# Patient Record
Sex: FEMALE | Race: WHITE | NOT HISPANIC OR LATINO | Employment: OTHER | URBAN - METROPOLITAN AREA
[De-identification: names, ages, dates, MRNs, and addresses within clinical notes are randomized per-mention and may not be internally consistent; named-entity substitution may affect disease eponyms.]

---

## 2019-03-21 ENCOUNTER — OFFICE VISIT (OUTPATIENT)
Dept: FAMILY MEDICINE | Facility: CLINIC | Age: 64
End: 2019-03-21
Payer: MEDICARE

## 2019-03-21 VITALS
HEIGHT: 65 IN | BODY MASS INDEX: 21.47 KG/M2 | HEART RATE: 71 BPM | TEMPERATURE: 98 F | DIASTOLIC BLOOD PRESSURE: 80 MMHG | WEIGHT: 128.88 LBS | SYSTOLIC BLOOD PRESSURE: 128 MMHG | OXYGEN SATURATION: 98 %

## 2019-03-21 DIAGNOSIS — E03.9 HYPOTHYROIDISM (ACQUIRED): ICD-10-CM

## 2019-03-21 DIAGNOSIS — L29.0 RECTAL ITCHING: ICD-10-CM

## 2019-03-21 DIAGNOSIS — F32.A DEPRESSION, UNSPECIFIED DEPRESSION TYPE: ICD-10-CM

## 2019-03-21 DIAGNOSIS — J01.90 ACUTE SINUSITIS, RECURRENCE NOT SPECIFIED, UNSPECIFIED LOCATION: Primary | ICD-10-CM

## 2019-03-21 DIAGNOSIS — E78.2 HYPERLIPIDEMIA, MIXED: ICD-10-CM

## 2019-03-21 DIAGNOSIS — R30.0 DYSURIA: ICD-10-CM

## 2019-03-21 DIAGNOSIS — R93.3 ABNORMAL COLONOSCOPY: ICD-10-CM

## 2019-03-21 DIAGNOSIS — E05.00 GRAVES' DISEASE: ICD-10-CM

## 2019-03-21 LAB
BILIRUB SERPL-MCNC: NORMAL MG/DL
BLOOD URINE, POC: NORMAL
COLOR, POC UA: NORMAL
GLUCOSE UR QL STRIP: NORMAL
KETONES UR QL STRIP: NORMAL
LEUKOCYTE ESTERASE URINE, POC: NORMAL
NITRITE, POC UA: NORMAL
PH, POC UA: 5
PROTEIN, POC: NORMAL
SPECIFIC GRAVITY, POC UA: 1.02
UROBILINOGEN, POC UA: NORMAL

## 2019-03-21 PROCEDURE — 99204 PR OFFICE/OUTPT VISIT, NEW, LEVL IV, 45-59 MIN: ICD-10-PCS | Mod: S$PBB,,, | Performed by: NURSE PRACTITIONER

## 2019-03-21 PROCEDURE — 99999 PR PBB SHADOW E&M-NEW PATIENT-LVL IV: ICD-10-PCS | Mod: PBBFAC,,, | Performed by: NURSE PRACTITIONER

## 2019-03-21 PROCEDURE — 99204 OFFICE O/P NEW MOD 45 MIN: CPT | Mod: PBBFAC,PN | Performed by: NURSE PRACTITIONER

## 2019-03-21 PROCEDURE — 81002 URINALYSIS NONAUTO W/O SCOPE: CPT | Mod: PBBFAC,PN,59 | Performed by: NURSE PRACTITIONER

## 2019-03-21 PROCEDURE — 99204 OFFICE O/P NEW MOD 45 MIN: CPT | Mod: S$PBB,,, | Performed by: NURSE PRACTITIONER

## 2019-03-21 PROCEDURE — 99999 PR PBB SHADOW E&M-NEW PATIENT-LVL IV: CPT | Mod: PBBFAC,,, | Performed by: NURSE PRACTITIONER

## 2019-03-21 RX ORDER — ESCITALOPRAM OXALATE 5 MG/5ML
SOLUTION ORAL
COMMUNITY
End: 2019-03-21 | Stop reason: CLARIF

## 2019-03-21 RX ORDER — DOXYCYCLINE 100 MG/1
100 CAPSULE ORAL 2 TIMES DAILY
Qty: 20 CAPSULE | Refills: 0 | Status: SHIPPED | OUTPATIENT
Start: 2019-03-21 | End: 2019-04-04

## 2019-03-21 RX ORDER — FLUCONAZOLE 150 MG/1
150 TABLET ORAL ONCE
Qty: 1 TABLET | Refills: 0 | Status: SHIPPED | OUTPATIENT
Start: 2019-03-21 | End: 2019-03-21

## 2019-03-21 RX ORDER — DEXTROAMPHETAMINE SACCHARATE, AMPHETAMINE ASPARTATE, DEXTROAMPHETAMINE SULFATE AND AMPHETAMINE SULFATE 1.25; 1.25; 1.25; 1.25 MG/1; MG/1; MG/1; MG/1
TABLET ORAL
COMMUNITY
End: 2019-12-23

## 2019-03-21 RX ORDER — CETIRIZINE HYDROCHLORIDE 10 MG/1
10 TABLET ORAL DAILY
COMMUNITY
End: 2021-09-21

## 2019-03-21 RX ORDER — LEVOTHYROXINE SODIUM 125 UG/1
CAPSULE ORAL
COMMUNITY
End: 2019-03-25 | Stop reason: SDUPTHER

## 2019-03-21 RX ORDER — ESCITALOPRAM OXALATE 10 MG/1
10 TABLET ORAL DAILY
Qty: 30 TABLET | Refills: 11 | Status: SHIPPED | OUTPATIENT
Start: 2019-03-21 | End: 2020-04-06 | Stop reason: SDUPTHER

## 2019-03-21 NOTE — PROGRESS NOTES
This dictation has been generated using Modal Fluency Dictation some phonetic errors may occur. Please contact author for clarification if needed.     Problem List Items Addressed This Visit     Graves' disease    Relevant Orders    Comprehensive metabolic panel    TSH    Hypothyroidism (acquired)    Relevant Orders    Comprehensive metabolic panel    TSH    Hyperlipidemia, mixed    Relevant Orders    Comprehensive metabolic panel    Lipid panel    Abnormal colonoscopy    Overview     Colon polyp at [facility] 2018 repeat 5 years.            Other Visit Diagnoses     Acute sinusitis, recurrence not specified, unspecified location    -  Primary    Relevant Orders    CBC auto differential    Dysuria        Relevant Orders    POCT urine dipstick without microscope    Urinalysis    Rectal itching        Relevant Orders    Stool Exam-Ova,Cysts,Parasites    Depression, unspecified depression type        Relevant Orders    Ambulatory referral to Psychiatry        Orders Placed This Encounter    CBC auto differential    Comprehensive metabolic panel    Lipid panel    Urinalysis    TSH    Stool Exam-Ova,Cysts,Parasites    Ambulatory referral to Psychiatry    POCT urine dipstick without microscope    doxycycline (MONODOX) 100 MG capsule    fluconazole (DIFLUCAN) 150 MG Tab    escitalopram oxalate (LEXAPRO) 10 MG tablet     Multiple complaints. Difficult historian needs to follow up to review labs and discuss further issues  Refer to psych for depression, focus, and stimulant use  Sinusitis doxy  POCT uti doxy  Rectal itching. ocp  Empiric therapy for yeast vaginitis due to abx    Follow-up in about 2 weeks (around 4/4/2019).    ________________________________________________________________  ________________________________________________________________      Chief Complaint   Patient presents with    Sinus Problem     sinus issues and possible uti     History of present illness  This 63 y.o. presents today for  complaint of multiple issues however booked apt for sinus issues and possible uti. Pt is new to the area. Was follwing with Dr. Lopez in [place], [place]. She has not taken her thyroid med or depression med for a month.   One month ago she had flu like symptoms with f/c/and b/a. Since then she has had lingering sinus symptoms and puff eyes. She notes chest congestion. Also notes fatigue.   Thinks she has a uti. Notes frequency and burning. Symptoms for about a week. No meds. No fever or chills.  Depression and cognition issues states she was taking 5 mg lexapro and 5 mg stimulant. Has always had focus issues.   Elevated lipids. Was able to tolerate current statin only. She had ms pains on   Answers for HPI/ROS submitted by the patient on 3/20/2019   activity change: Yes  unexpected weight change: No  neck pain: No  hearing loss: No  rhinorrhea: Yes  trouble swallowing: No  eye discharge: Yes  visual disturbance: No  chest tightness: No  wheezing: No  chest pain: No  palpitations: No  blood in stool: No  constipation: No  vomiting: No  diarrhea: No  polydipsia: No  polyuria: Yes  difficulty urinating: No  hematuria: No  menstrual problem: No  dysuria: Yes  joint swelling: Yes  arthralgias: No  headaches: Yes  weakness: Yes  confusion: Yes  dysphoric mood: Yes    Reviewed histories with pt. It was from the portal but not pulling into note. Nothing contributory on fly hx.     History reviewed. No pertinent past medical history.    History reviewed. No pertinent surgical history.    History reviewed. No pertinent family history.    Social History     Socioeconomic History    Marital status:      Spouse name: None    Number of children: None    Years of education: None    Highest education level: None   Social Needs    Financial resource strain: None    Food insecurity - worry: None    Food insecurity - inability: None    Transportation needs - medical: None    Transportation needs - non-medical: None    Occupational History    None   Tobacco Use    Smoking status: None   Substance and Sexual Activity    Alcohol use: None    Drug use: None    Sexual activity: None   Other Topics Concern    None   Social History Narrative    None       Current Outpatient Medications   Medication Sig Dispense Refill    cetirizine (ZYRTEC) 10 MG tablet Take 10 mg by mouth once daily.      dextroamphetamine-amphetamine (ADDERALL) 5 mg Tab       levothyroxine 125 mcg Cap       rosuvastatin calcium (CRESTOR ORAL)       doxycycline (MONODOX) 100 MG capsule Take 1 capsule (100 mg total) by mouth 2 (two) times daily. 20 capsule 0    escitalopram oxalate (LEXAPRO) 10 MG tablet Take 1 tablet (10 mg total) by mouth once daily. 30 tablet 11    fluconazole (DIFLUCAN) 150 MG Tab Take 1 tablet (150 mg total) by mouth once. for 1 dose 1 tablet 0     No current facility-administered medications for this visit.        Review of patient's allergies indicates:  No Known Allergies    Physical examination  Vitals Reviewed  Gen. Well-dressed well-nourished   Skin warm dry and intact.  No rashes noted.  HEENT.  TM intact bilateral with normal light reflex.  No mastoid tenderness during percussion.  Nares patent bilateral.  Pharynx is unremarkable.  No maxillary or frontal sinus tenderness when percussed. Eyes prominent.   Neck is supple without adenopathy  Chest.  Respirations are even unlabored.  Lungs are clear to auscultation.  Cardiac regular rate and rhythm.  No chest wall adenopathy noted.  Abdomen is soft and not distended.  Bowel sounds are present.  No tenderness during palpation of the abdomen.  No Hepatosplenomegaly noted.  No hernia noted.  No CVA tenderness to percussion.    Neuro. Awake alert oriented x4.  Normal judgment and cognition noted.  Extremities no clubbing cyanosis or edema noted.     Call or return to clinic prn if these symptoms worsen or fail to improve as anticipated.

## 2019-03-22 ENCOUNTER — LAB VISIT (OUTPATIENT)
Dept: LAB | Facility: HOSPITAL | Age: 64
End: 2019-03-22
Attending: NURSE PRACTITIONER
Payer: MEDICARE

## 2019-03-22 DIAGNOSIS — J01.90 ACUTE SINUSITIS, RECURRENCE NOT SPECIFIED, UNSPECIFIED LOCATION: ICD-10-CM

## 2019-03-22 DIAGNOSIS — E03.9 HYPOTHYROIDISM (ACQUIRED): ICD-10-CM

## 2019-03-22 DIAGNOSIS — E05.00 GRAVES' DISEASE: ICD-10-CM

## 2019-03-22 DIAGNOSIS — E78.2 HYPERLIPIDEMIA, MIXED: ICD-10-CM

## 2019-03-22 LAB
ALBUMIN SERPL BCP-MCNC: 4 G/DL
ALP SERPL-CCNC: 57 U/L
ALT SERPL W/O P-5'-P-CCNC: 21 U/L
ANION GAP SERPL CALC-SCNC: 11 MMOL/L
AST SERPL-CCNC: 29 U/L
BASOPHILS # BLD AUTO: 0.14 K/UL
BASOPHILS NFR BLD: 1.3 %
BILIRUB SERPL-MCNC: 0.5 MG/DL
BUN SERPL-MCNC: 14 MG/DL
CALCIUM SERPL-MCNC: 9.3 MG/DL
CHLORIDE SERPL-SCNC: 104 MMOL/L
CHOLEST SERPL-MCNC: 473 MG/DL
CHOLEST/HDLC SERPL: 5.4 {RATIO}
CO2 SERPL-SCNC: 25 MMOL/L
CREAT SERPL-MCNC: 1 MG/DL
DIFFERENTIAL METHOD: ABNORMAL
EOSINOPHIL # BLD AUTO: 0.4 K/UL
EOSINOPHIL NFR BLD: 3.3 %
ERYTHROCYTE [DISTWIDTH] IN BLOOD BY AUTOMATED COUNT: 14.9 %
EST. GFR  (AFRICAN AMERICAN): >60 ML/MIN/1.73 M^2
EST. GFR  (NON AFRICAN AMERICAN): >60 ML/MIN/1.73 M^2
GLUCOSE SERPL-MCNC: 81 MG/DL
HCT VFR BLD AUTO: 42.6 %
HDLC SERPL-MCNC: 88 MG/DL
HDLC SERPL: 18.6 %
HGB BLD-MCNC: 13.6 G/DL
IMM GRANULOCYTES # BLD AUTO: 0.04 K/UL
IMM GRANULOCYTES NFR BLD AUTO: 0.4 %
LDLC SERPL CALC-MCNC: 363 MG/DL
LYMPHOCYTES # BLD AUTO: 4.1 K/UL
LYMPHOCYTES NFR BLD: 39.3 %
MCH RBC QN AUTO: 29.8 PG
MCHC RBC AUTO-ENTMCNC: 31.9 G/DL
MCV RBC AUTO: 93 FL
MONOCYTES # BLD AUTO: 0.5 K/UL
MONOCYTES NFR BLD: 4.8 %
NEUTROPHILS # BLD AUTO: 5.3 K/UL
NEUTROPHILS NFR BLD: 50.9 %
NONHDLC SERPL-MCNC: 385 MG/DL
NRBC BLD-RTO: 0 /100 WBC
PLATELET # BLD AUTO: 346 K/UL
PMV BLD AUTO: 9.1 FL
POTASSIUM SERPL-SCNC: 4.1 MMOL/L
PROT SERPL-MCNC: 7.2 G/DL
RBC # BLD AUTO: 4.57 M/UL
SODIUM SERPL-SCNC: 140 MMOL/L
T4 FREE SERPL-MCNC: <0.4 NG/DL
TRIGL SERPL-MCNC: 110 MG/DL
TSH SERPL DL<=0.005 MIU/L-ACNC: 148.28 UIU/ML
WBC # BLD AUTO: 10.48 K/UL

## 2019-03-22 PROCEDURE — 80061 LIPID PANEL: CPT

## 2019-03-22 PROCEDURE — 84439 ASSAY OF FREE THYROXINE: CPT

## 2019-03-22 PROCEDURE — 80053 COMPREHEN METABOLIC PANEL: CPT

## 2019-03-22 PROCEDURE — 36415 COLL VENOUS BLD VENIPUNCTURE: CPT | Mod: PN

## 2019-03-22 PROCEDURE — 84443 ASSAY THYROID STIM HORMONE: CPT

## 2019-03-22 PROCEDURE — 85025 COMPLETE CBC W/AUTO DIFF WBC: CPT

## 2019-03-25 DIAGNOSIS — E03.9 HYPOTHYROIDISM (ACQUIRED): ICD-10-CM

## 2019-03-25 DIAGNOSIS — E05.00 GRAVES' DISEASE: Primary | ICD-10-CM

## 2019-03-25 RX ORDER — LEVOTHYROXINE SODIUM 125 UG/1
125 CAPSULE ORAL DAILY
Qty: 90 CAPSULE | Refills: 1 | Status: SHIPPED | OUTPATIENT
Start: 2019-03-25 | End: 2019-08-28 | Stop reason: SDUPTHER

## 2019-03-25 NOTE — TELEPHONE ENCOUNTER
Thyroid hormones are abnormal. She has missed more than a month of meds. She needs to repeat her tsh in 6 weeks.

## 2019-03-26 ENCOUNTER — LAB VISIT (OUTPATIENT)
Dept: LAB | Facility: HOSPITAL | Age: 64
End: 2019-03-26
Attending: NURSE PRACTITIONER
Payer: MEDICARE

## 2019-03-26 DIAGNOSIS — L29.0 RECTAL ITCHING: ICD-10-CM

## 2019-03-26 PROCEDURE — 87209 SMEAR COMPLEX STAIN: CPT

## 2019-03-27 ENCOUNTER — TELEPHONE (OUTPATIENT)
Dept: FAMILY MEDICINE | Facility: CLINIC | Age: 64
End: 2019-03-27

## 2019-03-27 NOTE — TELEPHONE ENCOUNTER
Spoke with pharmacist, Rx as sent over as caps instead of tabs and caps are not available in generic. Pharmacist corrected to generic tabs. Pt notified and expressed understanding

## 2019-03-27 NOTE — TELEPHONE ENCOUNTER
----- Message from Юлия Morris sent at 3/27/2019 11:49 AM CDT -----  Contact: self   Patient want to know if you can call a new rx in for levothyroxine 125 mcg Cap because of insurance purpose 30 day supply please send to [facility], any questions please call back at [phone] (home)     [facility] - [place] - [address]  [address]  [place] [address]  Phone: [phone] Fax: [phone]     Called and spoke to patient, patient was notified of results and verbalized understanding.      Pharmacy: [id number] [place] [id number] [address]

## 2019-03-28 LAB — O+P STL TRI STN: NORMAL

## 2019-04-04 ENCOUNTER — OFFICE VISIT (OUTPATIENT)
Dept: FAMILY MEDICINE | Facility: CLINIC | Age: 64
End: 2019-04-04
Payer: MEDICARE

## 2019-04-04 DIAGNOSIS — E03.9 HYPOTHYROIDISM (ACQUIRED): Primary | ICD-10-CM

## 2019-04-04 DIAGNOSIS — R39.9 UTI SYMPTOMS: ICD-10-CM

## 2019-04-04 DIAGNOSIS — E78.2 HYPERLIPIDEMIA, MIXED: ICD-10-CM

## 2019-04-04 DIAGNOSIS — E05.00 GRAVES' DISEASE: ICD-10-CM

## 2019-04-04 PROCEDURE — 99999 PR PBB SHADOW E&M-EST. PATIENT-LVL II: CPT | Mod: PBBFAC,,, | Performed by: NURSE PRACTITIONER

## 2019-04-04 PROCEDURE — 99212 OFFICE O/P EST SF 10 MIN: CPT | Mod: PBBFAC,PN | Performed by: NURSE PRACTITIONER

## 2019-04-04 PROCEDURE — 99214 OFFICE O/P EST MOD 30 MIN: CPT | Mod: S$PBB,,, | Performed by: NURSE PRACTITIONER

## 2019-04-04 PROCEDURE — 99214 PR OFFICE/OUTPT VISIT, EST, LEVL IV, 30-39 MIN: ICD-10-PCS | Mod: S$PBB,,, | Performed by: NURSE PRACTITIONER

## 2019-04-04 PROCEDURE — 99999 PR PBB SHADOW E&M-EST. PATIENT-LVL II: ICD-10-PCS | Mod: PBBFAC,,, | Performed by: NURSE PRACTITIONER

## 2019-04-04 RX ORDER — NITROFURANTOIN 25; 75 MG/1; MG/1
100 CAPSULE ORAL 2 TIMES DAILY
Qty: 14 CAPSULE | Refills: 0 | Status: SHIPPED | OUTPATIENT
Start: 2019-04-04 | End: 2019-09-12 | Stop reason: ALTCHOICE

## 2019-04-04 NOTE — PROGRESS NOTES
This dictation has been generated using Modal Fluency Dictation some phonetic errors may occur. Please contact author for clarification if needed.     Problem List Items Addressed This Visit     Graves' disease    Hypothyroidism (acquired) - Primary    Hyperlipidemia, mixed      Other Visit Diagnoses     UTI symptoms        Relevant Orders    Urinalysis    Urine culture        Orders Placed This Encounter    Urine culture    Urinalysis    nitrofurantoin, macrocrystal-monohydrate, (MACROBID) 100 MG capsule     Multiple complaints. Difficult historian needs to follow up to review labs and discuss further issues  Refer to psych for depression, focus, and stimulant use  Follow-up on Graves disease and hypothyroid.  She restarted her meds.  Recheck labs at the end of the month.  Discuss that thyroid is affecting other labs specifically cholesterol.  Patient notes some dysuria and wants repeat of urinalysis.  Completed doxycycline as directed with relapse of symptoms.    Follow up in about 3 months (around 7/4/2019).    ________________________________________________________________  ________________________________________________________________      Chief Complaint   Patient presents with    Follow-up     burning when urinating     History of present illness  This 63 y.o. presents today for complaint of follow-up on thyroid.  Patient has resume meds.  She will redo her thyroid levels at the end of the month.  We discussed that her cholesterol is elevated secondary to the absence of thyroid meds for more than a month.  I will have to follow up on that in a couple of months and may consider addition of statin at that time.  Patient notes relapse of possible UTI symptoms.  She has had burning with urination.  She took the doxycycline as directed for sinus issue and UTI symptoms.  She notes improvement while taking however relapse afterwards.  She denies any fever or chills.  LOV 3/21/19 multiple issues however booked  apt for sinus issues and possible uti. Pt is new to the area. Was follwing with Dr. Lopez in [place], [place]. She has not taken her thyroid med or depression med for a month.   One month ago she had flu like symptoms with f/c/and b/a. Since then she has had lingering sinus symptoms and puff eyes. She notes chest congestion. Also notes fatigue.   Thinks she has a uti. Notes frequency and burning. Symptoms for about a week. No meds. No fever or chills.  Depression and cognition issues states she was taking 5 mg lexapro and 5 mg stimulant. Has always had focus issues.   Elevated lipids. Was able to tolerate current statin only. She had ms pains on      activity change: Yes  unexpected weight change: Yes  neck pain: No  hearing loss: No  rhinorrhea: No  trouble swallowing: No  eye discharge: No  visual disturbance: No  chest tightness: No  wheezing: No  chest pain: No  palpitations: No  blood in stool: No  constipation: No  vomiting: No  diarrhea: No  polydipsia: No  polyuria: Yes  difficulty urinating: No  hematuria: No  menstrual problem: No  dysuria: Yes  joint swelling: Yes  arthralgias: No  weakness: Yes  confusion: Yes  dysphoric mood: Yes    Reviewed histories with pt. It was from the portal but not pulling into note. Nothing contributory on fly hx.     Past Medical History:   Diagnosis Date    Abnormal colonoscopy 3/21/2019    Colon polyp at  2018 repeat 5 years.     Graves' disease 3/21/2019    Hyperlipidemia, mixed 3/21/2019    Hypothyroidism (acquired) 3/21/2019    Lyme disease        No past surgical history on file.    No family history on file.    Social History     Socioeconomic History    Marital status:      Spouse name: Not on file    Number of children: Not on file    Years of education: Not on file    Highest education level: Not on file   Occupational History    Not on file   Social Needs    Financial resource strain: Not on file    Food insecurity:     Worry: Not on file      Inability: Not on file    Transportation needs:     Medical: Not on file     Non-medical: Not on file   Tobacco Use    Smoking status: Not on file   Substance and Sexual Activity    Alcohol use: Not on file    Drug use: Not on file    Sexual activity: Not on file   Lifestyle    Physical activity:     Days per week: Not on file     Minutes per session: Not on file    Stress: Not on file   Relationships    Social connections:     Talks on phone: Not on file     Gets together: Not on file     Attends Cheondoism service: Not on file     Active member of club or organization: Not on file     Attends meetings of clubs or organizations: Not on file     Relationship status: Not on file   Other Topics Concern    Not on file   Social History Narrative    Not on file       Current Outpatient Medications   Medication Sig Dispense Refill    cetirizine (ZYRTEC) 10 MG tablet Take 10 mg by mouth once daily.      dextroamphetamine-amphetamine (ADDERALL) 5 mg Tab       escitalopram oxalate (LEXAPRO) 10 MG tablet Take 1 tablet (10 mg total) by mouth once daily. 30 tablet 11    levothyroxine 125 mcg Cap Take 125 mcg by mouth once daily. 90 capsule 1    nitrofurantoin, macrocrystal-monohydrate, (MACROBID) 100 MG capsule Take 1 capsule (100 mg total) by mouth 2 (two) times daily. 14 capsule 0    rosuvastatin calcium (CRESTOR ORAL)        No current facility-administered medications for this visit.        Review of patient's allergies indicates:  No Known Allergies    Physical examination  Vitals Reviewed  Gen. Well-dressed well-nourished   Skin warm dry and intact.  No rashes noted.  HEENT.  TM intact bilateral with normal light reflex.  No mastoid tenderness during percussion.  Nares patent bilateral.  Pharynx is unremarkable.  No maxillary or frontal sinus tenderness when percussed. Eyes prominent.   Neck is supple without adenopathy  Chest.  Respirations are even unlabored.  Lungs are clear to auscultation.  Cardiac  regular rate and rhythm.  No chest wall adenopathy noted.  Abdomen is soft and not distended.  Bowel sounds are present.  No tenderness during palpation of the abdomen.  No Hepatosplenomegaly noted.  No hernia noted.  No CVA tenderness to percussion.    Neuro. Awake alert oriented x4.  Normal judgment and cognition noted.  Extremities no clubbing cyanosis or edema noted.     Call or return to clinic prn if these symptoms worsen or fail to improve as anticipated.

## 2019-04-25 ENCOUNTER — LAB VISIT (OUTPATIENT)
Dept: LAB | Facility: HOSPITAL | Age: 64
End: 2019-04-25
Payer: MEDICARE

## 2019-04-25 DIAGNOSIS — E05.00 GRAVES' DISEASE: ICD-10-CM

## 2019-04-25 LAB — TSH SERPL DL<=0.005 MIU/L-ACNC: 3.32 UIU/ML (ref 0.4–4)

## 2019-04-25 PROCEDURE — 36415 COLL VENOUS BLD VENIPUNCTURE: CPT | Mod: PN

## 2019-04-25 PROCEDURE — 84443 ASSAY THYROID STIM HORMONE: CPT

## 2019-08-28 DIAGNOSIS — E03.9 HYPOTHYROIDISM (ACQUIRED): Primary | ICD-10-CM

## 2019-08-28 DIAGNOSIS — E78.2 HYPERLIPIDEMIA, MIXED: ICD-10-CM

## 2019-08-28 NOTE — TELEPHONE ENCOUNTER
Please see pending refill request and advise.   Last ov 4/4/19  Last filled 3/25/19 #90 1 refill

## 2019-08-29 RX ORDER — LEVOTHYROXINE SODIUM 125 UG/1
125 CAPSULE ORAL DAILY
Qty: 90 CAPSULE | Refills: 1 | Status: SHIPPED | OUTPATIENT
Start: 2019-08-29 | End: 2020-03-27 | Stop reason: SDUPTHER

## 2019-08-29 NOTE — TELEPHONE ENCOUNTER
Pt needs follow up apt to check cholesterol. She is overdue for follow up since last month  Thyroid med refilled.   Also overdue for other health screenings. Make apt for annual exam.

## 2019-09-05 ENCOUNTER — TELEPHONE (OUTPATIENT)
Dept: FAMILY MEDICINE | Facility: CLINIC | Age: 64
End: 2019-09-05

## 2019-09-05 NOTE — TELEPHONE ENCOUNTER
Received fax from [facility] pharmacy that pt would like rx filled through [facility]. Changed pharmacy to reflect [facility].

## 2019-09-12 ENCOUNTER — TELEPHONE (OUTPATIENT)
Dept: FAMILY MEDICINE | Facility: CLINIC | Age: 64
End: 2019-09-12

## 2019-09-12 ENCOUNTER — OFFICE VISIT (OUTPATIENT)
Dept: FAMILY MEDICINE | Facility: CLINIC | Age: 64
End: 2019-09-12
Payer: MEDICARE

## 2019-09-12 VITALS
TEMPERATURE: 98 F | OXYGEN SATURATION: 97 % | HEIGHT: 65 IN | SYSTOLIC BLOOD PRESSURE: 126 MMHG | HEART RATE: 66 BPM | BODY MASS INDEX: 21.16 KG/M2 | WEIGHT: 127 LBS | DIASTOLIC BLOOD PRESSURE: 84 MMHG

## 2019-09-12 DIAGNOSIS — J30.9 ALLERGIC RHINITIS, UNSPECIFIED SEASONALITY, UNSPECIFIED TRIGGER: ICD-10-CM

## 2019-09-12 DIAGNOSIS — E03.9 HYPOTHYROIDISM, ACQUIRED: Primary | ICD-10-CM

## 2019-09-12 DIAGNOSIS — H81.10 BENIGN PAROXYSMAL POSITIONAL VERTIGO, UNSPECIFIED LATERALITY: ICD-10-CM

## 2019-09-12 DIAGNOSIS — G45.9 TIA (TRANSIENT ISCHEMIC ATTACK): ICD-10-CM

## 2019-09-12 DIAGNOSIS — Z12.39 SCREENING FOR BREAST CANCER: ICD-10-CM

## 2019-09-12 DIAGNOSIS — E78.01 FAMILIAL HYPERCHOLESTEROLEMIA: ICD-10-CM

## 2019-09-12 DIAGNOSIS — R42 DIZZINESS: ICD-10-CM

## 2019-09-12 DIAGNOSIS — R42 VERTIGO: ICD-10-CM

## 2019-09-12 DIAGNOSIS — R07.89 CHEST DISCOMFORT: ICD-10-CM

## 2019-09-12 DIAGNOSIS — Z11.59 ENCOUNTER FOR SCREENING FOR OTHER VIRAL DISEASES: ICD-10-CM

## 2019-09-12 PROBLEM — E78.2 HYPERLIPIDEMIA, MIXED: Status: RESOLVED | Noted: 2019-03-21 | Resolved: 2019-09-12

## 2019-09-12 PROCEDURE — 99203 OFFICE O/P NEW LOW 30 MIN: CPT | Mod: HCNC,S$GLB,, | Performed by: FAMILY MEDICINE

## 2019-09-12 PROCEDURE — 99203 PR OFFICE/OUTPT VISIT, NEW, LEVL III, 30-44 MIN: ICD-10-PCS | Mod: HCNC,S$GLB,, | Performed by: FAMILY MEDICINE

## 2019-09-12 PROCEDURE — 3008F PR BODY MASS INDEX (BMI) DOCUMENTED: ICD-10-PCS | Mod: HCNC,CPTII,S$GLB, | Performed by: FAMILY MEDICINE

## 2019-09-12 PROCEDURE — 93005 ELECTROCARDIOGRAM TRACING: CPT | Mod: HCNC,S$GLB,, | Performed by: FAMILY MEDICINE

## 2019-09-12 PROCEDURE — 99999 PR PBB SHADOW E&M-EST. PATIENT-LVL V: ICD-10-PCS | Mod: PBBFAC,HCNC,, | Performed by: FAMILY MEDICINE

## 2019-09-12 PROCEDURE — 99999 PR PBB SHADOW E&M-EST. PATIENT-LVL V: CPT | Mod: PBBFAC,HCNC,, | Performed by: FAMILY MEDICINE

## 2019-09-12 PROCEDURE — 93005 EKG 12-LEAD: ICD-10-PCS | Mod: HCNC,S$GLB,, | Performed by: FAMILY MEDICINE

## 2019-09-12 PROCEDURE — 93010 EKG 12-LEAD: ICD-10-PCS | Mod: HCNC,S$GLB,, | Performed by: INTERNAL MEDICINE

## 2019-09-12 PROCEDURE — 93010 ELECTROCARDIOGRAM REPORT: CPT | Mod: HCNC,S$GLB,, | Performed by: INTERNAL MEDICINE

## 2019-09-12 PROCEDURE — 3008F BODY MASS INDEX DOCD: CPT | Mod: HCNC,CPTII,S$GLB, | Performed by: FAMILY MEDICINE

## 2019-09-12 RX ORDER — HYDROXYZINE HYDROCHLORIDE 25 MG/1
25 TABLET, FILM COATED ORAL 3 TIMES DAILY PRN
Qty: 90 TABLET | Refills: 0 | Status: SHIPPED | OUTPATIENT
Start: 2019-09-12 | End: 2019-10-08 | Stop reason: SDUPTHER

## 2019-09-12 RX ORDER — ROSUVASTATIN CALCIUM 40 MG/1
40 TABLET, COATED ORAL NIGHTLY
Qty: 90 TABLET | Refills: 4 | Status: SHIPPED | OUTPATIENT
Start: 2019-09-12 | End: 2021-01-08 | Stop reason: SDUPTHER

## 2019-09-12 NOTE — TELEPHONE ENCOUNTER
Pt states it was discussed at her appt that she would be getting an abx and hydroxyzine. She would like the bx to [facility] and the hydroxyzine to [facility].

## 2019-09-12 NOTE — TELEPHONE ENCOUNTER
I never discussed antibiotics.  I will send refill of hydroxyzine to [facility]

## 2019-09-12 NOTE — TELEPHONE ENCOUNTER
----- Message from Marianne Prabhakar sent at 9/12/2019 12:49 PM CDT -----  Contact: self  Type: Needs Medical Advice    Who Called:  self  Symptoms (please be specific):    How long has patient had these symptoms:    Pharmacy name and phone #:    [id number] - [place] - [address]  [address]  [facility] [address]  Phone: [phone] Fax: [phone]  [facility] - [place], [place] - [address] [facility]  [address] [facility] [address]  Phone: [phone] Fax: [phone]  Best Call Back Number: [phone] (home)   Additional Information: Patient would like a prescription of Hydroxyzine sent to her mail order pharmacy [facility]. She would like the antibiotic sent to local pharmacy. Patient states the pharmacy has not received the request. Please call patient. Thanks!
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
## 2019-09-12 NOTE — TELEPHONE ENCOUNTER
Called patient in regards to scheduling her for her annual mammogram. No answer. Left voicemail to return phone call to clinic.

## 2019-09-16 ENCOUNTER — LAB VISIT (OUTPATIENT)
Dept: LAB | Facility: HOSPITAL | Age: 64
End: 2019-09-16
Attending: FAMILY MEDICINE
Payer: MEDICARE

## 2019-09-16 DIAGNOSIS — E78.2 HYPERLIPIDEMIA, MIXED: ICD-10-CM

## 2019-09-16 LAB
ALBUMIN SERPL BCP-MCNC: 4 G/DL (ref 3.5–5.2)
ALP SERPL-CCNC: 52 U/L (ref 55–135)
ALT SERPL W/O P-5'-P-CCNC: 12 U/L (ref 10–44)
ANION GAP SERPL CALC-SCNC: 10 MMOL/L (ref 8–16)
AST SERPL-CCNC: 13 U/L (ref 10–40)
BILIRUB SERPL-MCNC: 0.5 MG/DL (ref 0.1–1)
BUN SERPL-MCNC: 12 MG/DL (ref 8–23)
CALCIUM SERPL-MCNC: 9.6 MG/DL (ref 8.7–10.5)
CHLORIDE SERPL-SCNC: 106 MMOL/L (ref 95–110)
CO2 SERPL-SCNC: 25 MMOL/L (ref 23–29)
CREAT SERPL-MCNC: 0.8 MG/DL (ref 0.5–1.4)
EST. GFR  (AFRICAN AMERICAN): >60 ML/MIN/1.73 M^2
EST. GFR  (NON AFRICAN AMERICAN): >60 ML/MIN/1.73 M^2
GLUCOSE SERPL-MCNC: 90 MG/DL (ref 70–110)
POTASSIUM SERPL-SCNC: 4.2 MMOL/L (ref 3.5–5.1)
PROT SERPL-MCNC: 7.1 G/DL (ref 6–8.4)
SODIUM SERPL-SCNC: 141 MMOL/L (ref 136–145)

## 2019-09-16 PROCEDURE — 80053 COMPREHEN METABOLIC PANEL: CPT | Mod: HCNC

## 2019-09-16 PROCEDURE — 36415 COLL VENOUS BLD VENIPUNCTURE: CPT | Mod: HCNC,PN

## 2019-09-16 NOTE — TELEPHONE ENCOUNTER
Spoke w/ pt. Advised as recommended. Pt states that she has a painful abscessed tooth and that Dr Grant said he would start an antibiotic to take while she tries to get in with w/ dentist. She states she is waiting for the dentist office to call back to get her worked in next week. She is not sure of the name of the dentist office. Pt would like this sent to [facility].

## 2019-09-17 ENCOUNTER — HOSPITAL ENCOUNTER (OUTPATIENT)
Dept: RADIOLOGY | Facility: HOSPITAL | Age: 64
Discharge: HOME | End: 2019-09-17
Attending: FAMILY MEDICINE
Payer: MEDICARE

## 2019-09-17 DIAGNOSIS — R42 DIZZINESS: ICD-10-CM

## 2019-09-17 DIAGNOSIS — G45.9 TIA (TRANSIENT ISCHEMIC ATTACK): ICD-10-CM

## 2019-09-17 DIAGNOSIS — K04.7 DENTAL ABSCESS: Primary | ICD-10-CM

## 2019-09-17 PROCEDURE — 70470 CT HEAD/BRAIN W/O & W/DYE: CPT | Mod: 26,HCNC,, | Performed by: RADIOLOGY

## 2019-09-17 PROCEDURE — 70470 CT HEAD/BRAIN W/O & W/DYE: CPT | Mod: TC,HCNC,PO

## 2019-09-17 PROCEDURE — 25500020 PHARM REV CODE 255: Mod: HCNC,PO | Performed by: FAMILY MEDICINE

## 2019-09-17 PROCEDURE — 70470 CT HEAD WITH AND WITHOUT: ICD-10-PCS | Mod: 26,HCNC,, | Performed by: RADIOLOGY

## 2019-09-17 PROCEDURE — 93880 US CAROTID BILATERAL: ICD-10-PCS | Mod: 26,HCNC,, | Performed by: RADIOLOGY

## 2019-09-17 PROCEDURE — 93880 EXTRACRANIAL BILAT STUDY: CPT | Mod: TC,HCNC,PO

## 2019-09-17 PROCEDURE — 93880 EXTRACRANIAL BILAT STUDY: CPT | Mod: 26,HCNC,, | Performed by: RADIOLOGY

## 2019-09-17 RX ORDER — AMOXICILLIN AND CLAVULANATE POTASSIUM 500; 125 MG/1; MG/1
1 TABLET, FILM COATED ORAL 2 TIMES DAILY
Qty: 20 TABLET | Refills: 0 | Status: SHIPPED | OUTPATIENT
Start: 2019-09-17 | End: 2019-12-23

## 2019-09-17 RX ADMIN — IOHEXOL 75 ML: 350 INJECTION, SOLUTION INTRAVENOUS at 12:09

## 2019-09-17 NOTE — TELEPHONE ENCOUNTER
Spoke with pt, advised antibiotics and atarax were sent to the pharmacy as requested  Voiced understanding

## 2019-10-03 ENCOUNTER — LAB VISIT (OUTPATIENT)
Dept: LAB | Facility: HOSPITAL | Age: 64
End: 2019-10-03
Attending: INTERNAL MEDICINE
Payer: MEDICARE

## 2019-10-03 ENCOUNTER — OFFICE VISIT (OUTPATIENT)
Dept: CARDIOLOGY | Facility: CLINIC | Age: 64
End: 2019-10-03
Payer: MEDICARE

## 2019-10-03 VITALS
SYSTOLIC BLOOD PRESSURE: 120 MMHG | HEART RATE: 64 BPM | HEIGHT: 65 IN | WEIGHT: 126.31 LBS | DIASTOLIC BLOOD PRESSURE: 70 MMHG | BODY MASS INDEX: 21.04 KG/M2

## 2019-10-03 DIAGNOSIS — E78.01 FAMILIAL HYPERCHOLESTEROLEMIA: ICD-10-CM

## 2019-10-03 DIAGNOSIS — R07.89 ATYPICAL CHEST PAIN: ICD-10-CM

## 2019-10-03 DIAGNOSIS — E78.01 FAMILIAL HYPERCHOLESTEROLEMIA: Primary | ICD-10-CM

## 2019-10-03 PROCEDURE — 3008F BODY MASS INDEX DOCD: CPT | Mod: HCNC,CPTII,S$GLB, | Performed by: INTERNAL MEDICINE

## 2019-10-03 PROCEDURE — 83695 ASSAY OF LIPOPROTEIN(A): CPT | Mod: HCNC

## 2019-10-03 PROCEDURE — 99999 PR PBB SHADOW E&M-EST. PATIENT-LVL III: CPT | Mod: PBBFAC,HCNC,, | Performed by: INTERNAL MEDICINE

## 2019-10-03 PROCEDURE — 99204 PR OFFICE/OUTPT VISIT, NEW, LEVL IV, 45-59 MIN: ICD-10-PCS | Mod: HCNC,S$GLB,, | Performed by: INTERNAL MEDICINE

## 2019-10-03 PROCEDURE — 99204 OFFICE O/P NEW MOD 45 MIN: CPT | Mod: HCNC,S$GLB,, | Performed by: INTERNAL MEDICINE

## 2019-10-03 PROCEDURE — 36415 COLL VENOUS BLD VENIPUNCTURE: CPT | Mod: HCNC,PO

## 2019-10-03 PROCEDURE — 99999 PR PBB SHADOW E&M-EST. PATIENT-LVL III: ICD-10-PCS | Mod: PBBFAC,HCNC,, | Performed by: INTERNAL MEDICINE

## 2019-10-03 PROCEDURE — 3008F PR BODY MASS INDEX (BMI) DOCUMENTED: ICD-10-PCS | Mod: HCNC,CPTII,S$GLB, | Performed by: INTERNAL MEDICINE

## 2019-10-03 NOTE — PROGRESS NOTES
Subjective:    Patient ID:  Jackie Vanessa is a 64 y.o. female who presents for evaluation of Consult (Ref by Dr. Grant); Chest Pain; and Hyperlipidemia      Problem List Items Addressed This Visit        Cardiac/Vascular    Familial hypercholesterolemia - Primary      Other Visit Diagnoses     Atypical chest pain              HPI    Referred by Dr. Grant    The patient states that she has been having chest tightness for a few months kind of like a chest cold. Occasional unsteadiness as well. No relation to exertion.    Premature cardiac disease - Stroke and TIAs   Family history of familial hypercholesterolemia - not that she is aware of    Recent carotid doppler negative for significant stenosis    Personal history of heart attack or stroke - Had Stroke in the past and possible past TIAs  Family history of heart disease - Mom with high cholesterol    Past Medical History:   Diagnosis Date    Abnormal colonoscopy 3/21/2019    Colon polyp at  2018 repeat 5 years.     Graves' disease 3/21/2019    Hyperlipidemia, mixed 3/21/2019    Hypothyroidism (acquired) 3/21/2019    Lyme disease        History reviewed. No pertinent surgical history.    History reviewed. No pertinent family history.    Social History     Socioeconomic History    Marital status:      Spouse name: Not on file    Number of children: Not on file    Years of education: Not on file    Highest education level: Not on file   Occupational History    Not on file   Social Needs    Financial resource strain: Not on file    Food insecurity:     Worry: Not on file     Inability: Not on file    Transportation needs:     Medical: Not on file     Non-medical: Not on file   Tobacco Use    Smoking status: Current Every Day Smoker     Packs/day: 0.50     Years: 48.00     Pack years: 24.00     Types: Cigarettes    Smokeless tobacco: Never Used   Substance and Sexual Activity    Alcohol use: Not on file    Drug use: Not on file    Sexual  "activity: Not on file   Lifestyle    Physical activity:     Days per week: Not on file     Minutes per session: Not on file    Stress: Not on file   Relationships    Social connections:     Talks on phone: Not on file     Gets together: Not on file     Attends Episcopalian service: Not on file     Active member of club or organization: Not on file     Attends meetings of clubs or organizations: Not on file     Relationship status: Not on file   Other Topics Concern    Not on file   Social History Narrative    Not on file       Review of patient's allergies indicates:  No Known Allergies    Review of Systems   Constitution: Negative for decreased appetite, fever and malaise/fatigue.   Eyes: Negative for blurred vision.   Cardiovascular: Negative for chest pain, dyspnea on exertion, irregular heartbeat and leg swelling.   Respiratory: Negative for cough, hemoptysis, shortness of breath and wheezing.    Endocrine: Negative for cold intolerance and heat intolerance.   Hematologic/Lymphatic: Negative for bleeding problem.   Musculoskeletal: Negative for muscle weakness and myalgias.   Gastrointestinal: Negative for abdominal pain, constipation and diarrhea.   Genitourinary: Negative for bladder incontinence.   Neurological: Negative for dizziness and weakness.   Psychiatric/Behavioral: Negative for depression.        Objective:     Vitals:    10/03/19 1135   BP: 120/70   BP Location: Left arm   Patient Position: Sitting   BP Method: Medium (Manual)   Pulse: 64   Weight: 57.3 kg (126 lb 5.2 oz)   Height: 5' 5" (1.651 m)        Physical Exam   Constitutional: She is oriented to person, place, and time. She appears well-developed and well-nourished. No distress.   HENT:   Head: Normocephalic and atraumatic.   Neck: Neck supple. No JVD present.   Cardiovascular: Normal rate, regular rhythm and normal heart sounds. Exam reveals no gallop and no friction rub.   No murmur heard.  Pulmonary/Chest: Effort normal and breath " sounds normal. No respiratory distress. She has no wheezes. She has no rales.   Abdominal: Soft. Bowel sounds are normal. There is no tenderness. There is no rebound and no guarding.   Musculoskeletal: She exhibits no edema or tenderness.   Neurological: She is alert and oriented to person, place, and time.   Skin: Skin is warm and dry.   Psychiatric: Her behavior is normal.           Current Outpatient Medications on File Prior to Visit   Medication Sig    cetirizine (ZYRTEC) 10 MG tablet Take 10 mg by mouth once daily.    dextroamphetamine-amphetamine (ADDERALL) 5 mg Tab     escitalopram oxalate (LEXAPRO) 10 MG tablet Take 1 tablet (10 mg total) by mouth once daily.    hydrOXYzine HCl (ATARAX) 25 MG tablet Take 1 tablet (25 mg total) by mouth 3 (three) times daily as needed for Itching.    levothyroxine 125 mcg Cap Take 125 mcg by mouth once daily.    rosuvastatin (CRESTOR) 40 MG Tab Take 1 tablet (40 mg total) by mouth every evening.    amoxicillin-clavulanate 500-125mg (AUGMENTIN) 500-125 mg Tab Take 1 tablet (500 mg total) by mouth 2 (two) times daily. (Patient not taking: Reported on 10/3/2019)     No current facility-administered medications on file prior to visit.        Lipid Panel:   Lab Results   Component Value Date    CHOL 473 (H) 03/22/2019    HDL 88 (H) 03/22/2019    LDLCALC 363.0 (H) 03/22/2019    TRIG 110 03/22/2019    CHOLHDL 18.6 (L) 03/22/2019         The ASCVD Risk score ([place] NEO Jr., et al., 2013) failed to calculate for the following reasons:    The valid total cholesterol range is 130 to 320 mg/dL    All pertinent labs, imaging, and EKGs reviewed.    Assessment:       1. Familial hypercholesterolemia    2. Atypical chest pain         Plan:     Symptoms of atypical chest pain and dizziness  BP/Pulse good today  Patient meets criteria for familial hypercholesterolemia considering LDL is greater than 330 and has had prior stroke and likely TIAs    Echocardiogram  Nuclear stress test  Get  LPA level   Start Repatha 140 every 2 weeks.  Will send to [facility]    Recheck lipid panel and LPA level in 3 months   Continue crestor    Continue other cardiac medications  Mediterranean Diet/Cardiovascular Exercise Program    Patient queried and all questions were answered.    F/u in 3 months with lipid panel and LPA level prior. Will discuss exercise and smoking cessation at length at that time      Signed:    Ronal Mayo MD  10/3/2019 8:49 AM

## 2019-10-03 NOTE — LETTER
October 3, 2019      Drew Grant MD  [address]  [facility] [address]           [facility]  [address] [place] [address]  Phone: [phone]          Patient: Jackie Vanessa   MR Number: [id number]   YOB: 1955   Date of Visit: 10/3/2019       Dear Dr. Drew Grant:    Thank you for referring Jackie Vaenssa to me for evaluation. Attached you will find relevant portions of my assessment and plan of care.    If you have questions, please do not hesitate to call me. I look forward to following Jackie Vaenssa along with you.    Sincerely,    Ronal Mayo MD    Enclosure  CC:  No Recipients    If you would like to receive this communication electronically, please contact [email or web address] or [phone] to request more information on [employer] Link access.    For providers and/or their staff who would like to refer a patient to [facility], please contact us through our one-stop-shop provider referral line, [facility], at [phone].    If you feel you have received this communication in error or would no longer like to receive these types of communications, please e-mail [email or web address]

## 2019-10-04 ENCOUNTER — TELEPHONE (OUTPATIENT)
Dept: PHARMACY | Facility: CLINIC | Age: 64
End: 2019-10-04

## 2019-10-04 NOTE — TELEPHONE ENCOUNTER
Informed Patient  that [facility] received prescription for Repatha and benefits investigation is required.  [facility] will be back in touch once insurance determination is received.

## 2019-10-08 LAB — LPA SERPL-MCNC: 132 MG/DL (ref 0–30)

## 2019-10-09 RX ORDER — HYDROXYZINE HYDROCHLORIDE 25 MG/1
TABLET, FILM COATED ORAL
Qty: 90 TABLET | Refills: 0 | Status: SHIPPED | OUTPATIENT
Start: 2019-10-09 | End: 2020-09-14 | Stop reason: SDUPTHER

## 2019-10-10 ENCOUNTER — OFFICE VISIT (OUTPATIENT)
Dept: FAMILY MEDICINE | Facility: CLINIC | Age: 64
End: 2019-10-10
Payer: MEDICARE

## 2019-10-10 ENCOUNTER — OFFICE VISIT (OUTPATIENT)
Dept: OTOLARYNGOLOGY | Facility: CLINIC | Age: 64
End: 2019-10-10
Payer: MEDICARE

## 2019-10-10 ENCOUNTER — CLINICAL SUPPORT (OUTPATIENT)
Dept: AUDIOLOGY | Facility: CLINIC | Age: 64
End: 2019-10-10
Payer: MEDICARE

## 2019-10-10 VITALS — HEIGHT: 65 IN | WEIGHT: 124.75 LBS | BODY MASS INDEX: 20.79 KG/M2

## 2019-10-10 VITALS
WEIGHT: 125 LBS | DIASTOLIC BLOOD PRESSURE: 60 MMHG | TEMPERATURE: 99 F | HEIGHT: 65 IN | BODY MASS INDEX: 20.83 KG/M2 | HEART RATE: 69 BPM | OXYGEN SATURATION: 97 % | SYSTOLIC BLOOD PRESSURE: 130 MMHG

## 2019-10-10 DIAGNOSIS — E78.01 FAMILIAL HYPERCHOLESTEROLEMIA: ICD-10-CM

## 2019-10-10 DIAGNOSIS — Z23 IMMUNIZATION DUE: Primary | ICD-10-CM

## 2019-10-10 DIAGNOSIS — E05.00 GRAVES DISEASE: ICD-10-CM

## 2019-10-10 DIAGNOSIS — H91.93 HIGH FREQUENCY HEARING LOSS OF BOTH EARS: Primary | ICD-10-CM

## 2019-10-10 DIAGNOSIS — R42 DIZZINESS: ICD-10-CM

## 2019-10-10 DIAGNOSIS — R51.9 HEADACHE, CHRONIC DAILY: ICD-10-CM

## 2019-10-10 DIAGNOSIS — H93.19 TINNITUS, UNSPECIFIED LATERALITY: ICD-10-CM

## 2019-10-10 DIAGNOSIS — H90.3 BILATERAL HIGH FREQUENCY SENSORINEURAL HEARING LOSS: ICD-10-CM

## 2019-10-10 DIAGNOSIS — R42 DISEQUILIBRIUM: Primary | ICD-10-CM

## 2019-10-10 DIAGNOSIS — H93.13 TINNITUS, BILATERAL: ICD-10-CM

## 2019-10-10 PROCEDURE — 92567 TYMPANOMETRY: CPT | Mod: HCNC,S$GLB,, | Performed by: AUDIOLOGIST

## 2019-10-10 PROCEDURE — 3008F PR BODY MASS INDEX (BMI) DOCUMENTED: ICD-10-PCS | Mod: HCNC,CPTII,S$GLB, | Performed by: FAMILY MEDICINE

## 2019-10-10 PROCEDURE — 99999 PR PBB SHADOW E&M-EST. PATIENT-LVL III: ICD-10-PCS | Mod: PBBFAC,HCNC,, | Performed by: NURSE PRACTITIONER

## 2019-10-10 PROCEDURE — 90732 PPSV23 VACC 2 YRS+ SUBQ/IM: CPT | Mod: HCNC,S$GLB,, | Performed by: FAMILY MEDICINE

## 2019-10-10 PROCEDURE — 99999 PR PBB SHADOW E&M-EST. PATIENT-LVL I: ICD-10-PCS | Mod: PBBFAC,HCNC,,

## 2019-10-10 PROCEDURE — 99999 PR PBB SHADOW E&M-EST. PATIENT-LVL I: CPT | Mod: PBBFAC,HCNC,,

## 2019-10-10 PROCEDURE — 92557 COMPREHENSIVE HEARING TEST: CPT | Mod: HCNC,S$GLB,, | Performed by: AUDIOLOGIST

## 2019-10-10 PROCEDURE — 99204 OFFICE O/P NEW MOD 45 MIN: CPT | Mod: HCNC,S$GLB,, | Performed by: NURSE PRACTITIONER

## 2019-10-10 PROCEDURE — 92567 PR TYMPA2METRY: ICD-10-PCS | Mod: HCNC,S$GLB,, | Performed by: AUDIOLOGIST

## 2019-10-10 PROCEDURE — 92557 PR COMPREHENSIVE HEARING TEST: ICD-10-PCS | Mod: HCNC,S$GLB,, | Performed by: AUDIOLOGIST

## 2019-10-10 PROCEDURE — 99204 PR OFFICE/OUTPT VISIT, NEW, LEVL IV, 45-59 MIN: ICD-10-PCS | Mod: HCNC,S$GLB,, | Performed by: NURSE PRACTITIONER

## 2019-10-10 PROCEDURE — 99999 PR PBB SHADOW E&M-EST. PATIENT-LVL III: CPT | Mod: PBBFAC,HCNC,, | Performed by: NURSE PRACTITIONER

## 2019-10-10 PROCEDURE — 99499 UNLISTED E&M SERVICE: CPT | Mod: HCNC,S$GLB,, | Performed by: FAMILY MEDICINE

## 2019-10-10 PROCEDURE — G0009 PNEUMOCOCCAL POLYSACCHARIDE VACCINE 23-VALENT =>2YO SQ IM: ICD-10-PCS | Mod: HCNC,S$GLB,, | Performed by: FAMILY MEDICINE

## 2019-10-10 PROCEDURE — 99999 PR PBB SHADOW E&M-EST. PATIENT-LVL III: CPT | Mod: PBBFAC,HCNC,, | Performed by: FAMILY MEDICINE

## 2019-10-10 PROCEDURE — 99999 PR PBB SHADOW E&M-EST. PATIENT-LVL III: ICD-10-PCS | Mod: PBBFAC,HCNC,, | Performed by: FAMILY MEDICINE

## 2019-10-10 PROCEDURE — 3008F PR BODY MASS INDEX (BMI) DOCUMENTED: ICD-10-PCS | Mod: HCNC,CPTII,S$GLB, | Performed by: NURSE PRACTITIONER

## 2019-10-10 PROCEDURE — 99213 OFFICE O/P EST LOW 20 MIN: CPT | Mod: 25,HCNC,S$GLB, | Performed by: FAMILY MEDICINE

## 2019-10-10 PROCEDURE — 3008F BODY MASS INDEX DOCD: CPT | Mod: HCNC,CPTII,S$GLB, | Performed by: FAMILY MEDICINE

## 2019-10-10 PROCEDURE — 99499 RISK ADDL DX/OHS AUDIT: ICD-10-PCS | Mod: HCNC,S$GLB,, | Performed by: FAMILY MEDICINE

## 2019-10-10 PROCEDURE — 99213 PR OFFICE/OUTPT VISIT, EST, LEVL III, 20-29 MIN: ICD-10-PCS | Mod: 25,HCNC,S$GLB, | Performed by: FAMILY MEDICINE

## 2019-10-10 PROCEDURE — G0009 ADMIN PNEUMOCOCCAL VACCINE: HCPCS | Mod: HCNC,S$GLB,, | Performed by: FAMILY MEDICINE

## 2019-10-10 PROCEDURE — 3008F BODY MASS INDEX DOCD: CPT | Mod: HCNC,CPTII,S$GLB, | Performed by: NURSE PRACTITIONER

## 2019-10-10 PROCEDURE — 90732 PNEUMOCOCCAL POLYSACCHARIDE VACCINE 23-VALENT =>2YO SQ IM: ICD-10-PCS | Mod: HCNC,S$GLB,, | Performed by: FAMILY MEDICINE
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
## 2019-10-17 ENCOUNTER — CLINICAL SUPPORT (OUTPATIENT)
Dept: AUDIOLOGY | Facility: CLINIC | Age: 64
End: 2019-10-17
Payer: MEDICARE

## 2019-10-17 ENCOUNTER — PATIENT MESSAGE (OUTPATIENT)
Dept: OTOLARYNGOLOGY | Facility: CLINIC | Age: 64
End: 2019-10-17

## 2019-10-17 DIAGNOSIS — Z12.11 COLON CANCER SCREENING: ICD-10-CM

## 2019-10-17 DIAGNOSIS — R26.89 IMBALANCE: Primary | ICD-10-CM

## 2019-10-17 DIAGNOSIS — R42 DISEQUILIBRIUM: Primary | ICD-10-CM

## 2019-10-17 PROCEDURE — 92540 PR VESTIBULAR EVAL NYSTAG FOVL&PERPH STIM OSCIL TRACKING: ICD-10-PCS | Mod: HCNC,S$GLB,, | Performed by: AUDIOLOGIST

## 2019-10-17 PROCEDURE — 92537 PR CALORIC VSTBLR TEST W/REC BITHERMAL: ICD-10-PCS | Mod: HCNC,S$GLB,, | Performed by: AUDIOLOGIST

## 2019-10-17 PROCEDURE — 92537 CALORIC VSTBLR TEST W/REC: CPT | Mod: HCNC,S$GLB,, | Performed by: AUDIOLOGIST

## 2019-10-17 PROCEDURE — 92540 BASIC VESTIBULAR EVALUATION: CPT | Mod: HCNC,S$GLB,, | Performed by: AUDIOLOGIST
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
## 2019-10-17 NOTE — Clinical Note
Normal VNG. No evidence of caloric asymmetry or weakness.  Negative for BPPV bilaterally.  Recommend review of results with patient and referral to PT for VRT.

## 2019-10-21 ENCOUNTER — HOSPITAL ENCOUNTER (OUTPATIENT)
Dept: RADIOLOGY | Facility: HOSPITAL | Age: 64
Discharge: HOME | End: 2019-10-21
Attending: INTERNAL MEDICINE
Payer: MEDICARE

## 2019-10-21 ENCOUNTER — CLINICAL SUPPORT (OUTPATIENT)
Dept: CARDIOLOGY | Facility: CLINIC | Age: 64
End: 2019-10-21
Attending: INTERNAL MEDICINE
Payer: MEDICARE

## 2019-10-21 VITALS
SYSTOLIC BLOOD PRESSURE: 110 MMHG | HEART RATE: 75 BPM | WEIGHT: 125 LBS | DIASTOLIC BLOOD PRESSURE: 70 MMHG | BODY MASS INDEX: 20.83 KG/M2 | HEIGHT: 65 IN

## 2019-10-21 VITALS — WEIGHT: 125 LBS | HEIGHT: 65 IN | BODY MASS INDEX: 20.83 KG/M2

## 2019-10-21 DIAGNOSIS — R07.89 ATYPICAL CHEST PAIN: ICD-10-CM

## 2019-10-21 LAB
ASCENDING AORTA: 2.82 CM
AV INDEX (PROSTH): 1.03
AV MEAN GRADIENT: 4 MMHG
AV PEAK GRADIENT: 7 MMHG
AV VALVE AREA: 3.13 CM2
AV VELOCITY RATIO: 1.01
BSA FOR ECHO PROCEDURE: 1.61 M2
CV ECHO LV RWT: 0.45 CM
CV STRESS BASE HR: 62 BPM
DIASTOLIC BLOOD PRESSURE: 71 MMHG
DOP CALC AO PEAK VEL: 1.29 M/S
DOP CALC AO VTI: 28.51 CM
DOP CALC LVOT AREA: 3 CM2
DOP CALC LVOT DIAMETER: 1.97 CM
DOP CALC LVOT PEAK VEL: 1.3 M/S
DOP CALC LVOT STROKE VOLUME: 89.11 CM3
DOP CALCLVOT PEAK VEL VTI: 29.25 CM
E WAVE DECELERATION TIME: 271.49 MSEC
E/A RATIO: 0.89
E/E' RATIO: 5.37 M/S
ECHO LV POSTERIOR WALL: 0.8 CM (ref 0.6–1.1)
FRACTIONAL SHORTENING: 29 % (ref 28–44)
INTERVENTRICULAR SEPTUM: 0.79 CM (ref 0.6–1.1)
IVRT: 0.12 MSEC
LA MAJOR: 3.48 CM
LA MINOR: 3.58 CM
LA WIDTH: 3.01 CM
LEFT ATRIUM SIZE: 2.96 CM
LEFT ATRIUM VOLUME INDEX: 16.5 ML/M2
LEFT ATRIUM VOLUME: 26.73 CM3
LEFT INTERNAL DIMENSION IN SYSTOLE: 2.55 CM (ref 2.1–4)
LEFT VENTRICLE DIASTOLIC VOLUME INDEX: 33.45 ML/M2
LEFT VENTRICLE DIASTOLIC VOLUME: 54.18 ML
LEFT VENTRICLE MASS INDEX: 48 G/M2
LEFT VENTRICLE SYSTOLIC VOLUME INDEX: 14.5 ML/M2
LEFT VENTRICLE SYSTOLIC VOLUME: 23.5 ML
LEFT VENTRICULAR INTERNAL DIMENSION IN DIASTOLE: 3.59 CM (ref 3.5–6)
LEFT VENTRICULAR MASS: 77.75 G
LV LATERAL E/E' RATIO: 4.64 M/S
LV SEPTAL E/E' RATIO: 6.38 M/S
MV PEAK A VEL: 0.57 M/S
MV PEAK E VEL: 0.51 M/S
OHS CV CPX 1 MINUTE RECOVERY HEART RATE: 100 BPM
OHS CV CPX 85 PERCENT MAX PREDICTED HEART RATE MALE: 129
OHS CV CPX MAX PREDICTED HEART RATE: 151
OHS CV CPX PATIENT IS FEMALE: 1
OHS CV CPX PATIENT IS MALE: 0
OHS CV CPX PEAK DIASTOLIC BLOOD PRESSURE: 85 MMHG
OHS CV CPX PEAK HEAR RATE: 120 BPM
OHS CV CPX PEAK RATE PRESSURE PRODUCT: NORMAL
OHS CV CPX PEAK SYSTOLIC BLOOD PRESSURE: 162 MMHG
OHS CV CPX PERCENT MAX PREDICTED HEART RATE ACHIEVED: 79
OHS CV CPX RATE PRESSURE PRODUCT PRESENTING: 8680
PISA TR MAX VEL: 2.28 M/S
RA MAJOR: 3.38 CM
RA PRESSURE: 3 MMHG
RA WIDTH: 3.08 CM
RIGHT VENTRICULAR END-DIASTOLIC DIMENSION: 2.65 CM
SINUS: 2.53 CM
STJ: 2.29 CM
STRESS ECHO TARGET HR: 134.3 BPM
SYSTOLIC BLOOD PRESSURE: 140 MMHG
TDI LATERAL: 0.11 M/S
TDI SEPTAL: 0.08 M/S
TDI: 0.1 M/S
TR MAX PG: 21 MMHG
TRICUSPID ANNULAR PLANE SYSTOLIC EXCURSION: 2.02 CM
TV REST PULMONARY ARTERY PRESSURE: 24 MMHG

## 2019-10-21 PROCEDURE — 93306 ECHO (CUPID ONLY): ICD-10-PCS | Mod: HCNC,S$GLB,, | Performed by: INTERNAL MEDICINE

## 2019-10-21 PROCEDURE — 93015 STRESS TEST WITH MYOCARDIAL PERFUSION (CUPID ONLY): ICD-10-PCS | Mod: HCNC,,, | Performed by: INTERNAL MEDICINE

## 2019-10-21 PROCEDURE — 78452 STRESS TEST WITH MYOCARDIAL PERFUSION (CUPID ONLY): ICD-10-PCS | Mod: 26,HCNC,, | Performed by: INTERNAL MEDICINE

## 2019-10-21 PROCEDURE — 93306 TTE W/DOPPLER COMPLETE: CPT | Mod: HCNC,S$GLB,, | Performed by: INTERNAL MEDICINE

## 2019-10-21 PROCEDURE — 99999 PR PBB SHADOW E&M-EST. PATIENT-LVL I: ICD-10-PCS | Mod: PBBFAC,HCNC,,

## 2019-10-21 PROCEDURE — 78452 HT MUSCLE IMAGE SPECT MULT: CPT | Mod: 26,HCNC,, | Performed by: INTERNAL MEDICINE

## 2019-10-21 PROCEDURE — 93015 CV STRESS TEST SUPVJ I&R: CPT | Mod: HCNC,,, | Performed by: INTERNAL MEDICINE

## 2019-10-21 PROCEDURE — 99999 PR PBB SHADOW E&M-EST. PATIENT-LVL II: CPT | Mod: PBBFAC,HCNC,,

## 2019-10-21 PROCEDURE — 99999 PR PBB SHADOW E&M-EST. PATIENT-LVL II: ICD-10-PCS | Mod: PBBFAC,HCNC,,

## 2019-10-21 PROCEDURE — 99999 PR PBB SHADOW E&M-EST. PATIENT-LVL I: CPT | Mod: PBBFAC,HCNC,,

## 2019-10-23 NOTE — TELEPHONE ENCOUNTER
Documentation only:    Prior authorization for Repatha has been approved for 6 months.    Approval dates:  10/23/2019 through 04/23/2020    Patient co-pay: $8.50

## 2019-10-24 ENCOUNTER — CLINICAL SUPPORT (OUTPATIENT)
Dept: REHABILITATION | Facility: HOSPITAL | Age: 64
End: 2019-10-24
Payer: MEDICARE

## 2019-10-24 DIAGNOSIS — R26.81 GAIT INSTABILITY: Primary | ICD-10-CM

## 2019-10-24 DIAGNOSIS — M62.81 MUSCLE WEAKNESS: ICD-10-CM

## 2019-10-24 PROCEDURE — 97162 PT EVAL MOD COMPLEX 30 MIN: CPT | Mod: HCNC,PN | Performed by: PHYSICAL THERAPIST

## 2019-10-24 PROCEDURE — 97110 THERAPEUTIC EXERCISES: CPT | Mod: HCNC,PN | Performed by: PHYSICAL THERAPIST

## 2019-10-25 NOTE — PLAN OF CARE
[facility]  Physical Therapy Initial Evaluation    Name: Jackie Vanessa  Clinic Number: [id number]    Therapy Diagnosis:   Encounter Diagnoses   Name Primary?    Gait instability Yes    Muscle weakness      Physician: Marley Johnson NP    Physician Orders: PT Eval and Treat No inner ear problems. Balance Retraining therapy  Medical Diagnosis from Referral: Imbalance  Evaluation Date: 10/24/2019  Authorization Period Expiration: 10-16-20  Plan of Care Expiration: 12-19-19  Visit # / Visits authorized: 1/ 1  MD Follow up appointment: none scheduled    Time In: 4:02  Time Out: 4:55  Total Billable Time: 53 minutes    Precautions: Standard migraines, Graves disease    Subjective   Date of onset: November 2018  History of current condition - Asiya reports: she had moved and gotten flu and has Graves disease and thyroid was off so adjusted and feeling less tired, but still with dizziness.  In 2012 her head hit brick wall and had whiplash injury and had R UE radicular symptoms and had PT for about a year Pt states she was evaluated by cardiologist recently and was cleared 2 days ago and wants to start a walking program.  Pt states she feels dizzy when she walks and heat makes it worse.     Medical History:   Past Medical History:   Diagnosis Date    Abnormal colonoscopy 3/21/2019    Colon polyp at  2018 repeat 5 years.     Graves' disease 3/21/2019    Hyperlipidemia, mixed 3/21/2019    Hypothyroidism (acquired) 3/21/2019    Lyme disease        Surgical History:   Jackie Vanessa  has no past surgical history on file.    Medications:   Jackie has a current medication list which includes the following prescription(s): amoxicillin-clavulanate 500-125mg, cetirizine, dextroamphetamine-amphetamine, escitalopram oxalate, evolocumab, hydroxyzine hcl, levothyroxine, and rosuvastatin.    Allergies:   Review of patient's allergies indicates:  No Known Allergies     Imaging, CT scan films: There  is no acute abnormality.  There is no hemorrhage, mass/mass effect, acute edema or ischemia.  There is no pathologic enhancement.  There is mild nonspecific white matter change.  It should be noted that MRI is more sensitive in the detection of subtle or acute nonhemorrhagic ischemic disease.    Prior Therapy: 2012 for neck injury  Social History: her cousin is staying with her 2 story house and bedrooms upstairs, no trouble with steps   Occupation: on disability due to Graves disease, intolerance to heat and no stamina  2007  Prior Level of Function: walked 3-4 times a week 1 mile   Current Level of Function: unable to walk     Pain: not applicable     Pts goals: not walk like a drunk    Objective     Postural examination in standing:  - forward head  - forward shoulders    Postural examination in sitting:   - forward head  - forward shoulders    Functional assessment: no deficits in areas noted below  - walking/gait:  - sit to stand:   - sit to supine:        - supine to sit:   - supine to prone:      Standing eyes closed able to remain balanced without assistance, slight sway, c/o feeling like on a boat  Able to march in place with eyes closed and min movement noted   Gaze stabilization appears WNL  Romano Balance score 52/56 which is low risk for fall      Flexibility testing:  - hamstrings:     90/90 test R 20 L 20           - gastrocnemius:   DF ankle R 5 degrees L 5 degrees      Muscle Strength  MMT R L   Hip flexion 4+/5 4+/5   Hip abduction 4/5 4/5   Hip extension 3+/5 3+/5   Glut max 3-/5 3-/5        Knee extension 5/5 5/5   Knee flexion 5/5 5/5   Ankle dorsiflexion 5/5 5/5   Ankle plantar flexion 5/5 5/5   Ankle inversion 5/5 5/5   Ankle eversion 5/5 5/5     Endurance is / fair    Sensation: Intact      CMS Impairment/Limitation/Restriction for FOTO vertigo Survey    Therapist reviewed FOTO scores for Jackie VALERY Vanessa on 10/24/2019.   FOTO documents entered into [employer] - see Media section.    Limitation  Score: 26%       TREATMENT   Treatment Time In: 4:40  Treatment Time Out: 4:55  Total Treatment time separate from Evaluation: 15 minutes    Asiya received therapeutic exercises to develop strength, endurance and balance for 15 minutes including:  Leg lift prone alternate x 10  Hip ext prone with bent knee x 10  Single leg balance x 30 sec    Home Exercises and Patient Education Provided    Education provided:   - HEP progression of balance work to improve kinesthetic sense and proprioception required to improve balance    Written Home Exercises Provided: yes.  Exercises were reviewed and Asiya was able to demonstrate them prior to the end of the session.  Asiya demonstrated good  understanding of the education provided.     See EMR under Patient Instructions for exercises provided 10/24/2019.    Assessment   Jackie is a 64 y.o. female referred to outpatient Physical Therapy with a medical diagnosis of imbalance. Pt presents with muscle weakness and balance issues    Pt prognosis is Good.   Pt will benefit from skilled outpatient Physical Therapy to address the deficits stated above and in the chart below, provide pt/family education, and to maximize pt's level of independence.     Plan of care discussed with patient: Yes  Pt's spiritual, cultural and educational needs considered and patient is agreeable to the plan of care and goals as stated below:     Anticipated Barriers for therapy: Graves disease    Medical Necessity is demonstrated by the following  History  Co-morbidities and personal factors that may impact the plan of care Co-morbidities:   Graves disease    Personal Factors:   no deficits     moderate   Examination  Body Structures and Functions, activity limitations and participation restrictions that may impact the plan of care Body Regions:   lower extremities  trunk    Body Systems:    balance  gait    Participation Restrictions:   ADL    Activity limitations:   Learning and applying knowledge  no  deficits    General Tasks and Commands  no deficits    Communication  no deficits    Mobility  walking    Self care  no deficits    Domestic Life  shopping  cooking  doing house work (cleaning house, washing dishes, laundry)  assisting others    Interactions/Relationships  no deficits    Life Areas  no deficits    Community and Social Life  no deficits         moderate   Clinical Presentation evolving clinical presentation with changing clinical characteristics moderate   Decision Making/ Complexity Score: moderate     GOALS:   Short Term Goals:  4 weeks  Increase strength 1/2 muscle grade  Be able to perform HEP with minimal cueing required  Improve Romano Balance score  to 53    Long Term Goals: 8 weeks  Improve muscle strength 1 muscle grade  Restore ability to ambulate with normal gait pattern at all times  Walking for ADL and exercise will be restored without instability  Restore ability to stand for ADL without instability  Restore ability to perform ADL's and household activities independently and without instability  Improve Romano Balance score  to 54    Plan   Plan of care Certification: 10/24/2019 to 12-19-19.    Outpatient Physical Therapy 2 times weekly for 8 weeks to include the following interventions: Therapeutic exercises, manual therapy techniques neuromuscular re-education, therapeutic activities, modalities such as moist heat, ice, ultrasound and electrical stimulation, dry needling, and temporary orthotics will be considered and utilized as needed.      Jackie Nieves, PT        I certify the need for these services furnished under this plan of treatment and while under my care.    ____________________________________ Physician/Referring Practitioner                                  Date of Signature

## 2019-10-28 ENCOUNTER — CLINICAL SUPPORT (OUTPATIENT)
Dept: REHABILITATION | Facility: HOSPITAL | Age: 64
End: 2019-10-28
Payer: MEDICARE

## 2019-10-28 DIAGNOSIS — R26.81 GAIT INSTABILITY: Primary | ICD-10-CM

## 2019-10-28 DIAGNOSIS — M62.81 MUSCLE WEAKNESS: ICD-10-CM

## 2019-10-28 PROCEDURE — 97110 THERAPEUTIC EXERCISES: CPT | Mod: HCNC,PN

## 2019-10-28 PROCEDURE — 97112 NEUROMUSCULAR REEDUCATION: CPT | Mod: HCNC,PN

## 2019-10-31 ENCOUNTER — CLINICAL SUPPORT (OUTPATIENT)
Dept: REHABILITATION | Facility: HOSPITAL | Age: 64
End: 2019-10-31
Payer: MEDICARE

## 2019-10-31 DIAGNOSIS — R26.81 GAIT INSTABILITY: Primary | ICD-10-CM

## 2019-10-31 DIAGNOSIS — M62.81 MUSCLE WEAKNESS: ICD-10-CM

## 2019-10-31 PROCEDURE — 97112 NEUROMUSCULAR REEDUCATION: CPT | Mod: HCNC,PN

## 2019-10-31 PROCEDURE — 97110 THERAPEUTIC EXERCISES: CPT | Mod: HCNC,PN

## 2019-10-31 NOTE — PROGRESS NOTES
"  Physical Therapy Daily Treatment Note     Name: Jackie Vanessa  Clinic Number: [id number]    Therapy Diagnosis:   Encounter Diagnoses   Name Primary?    Gait instability Yes    Muscle weakness      Physician: Marley Johnson NP    Visit Date: 10/31/2019    Physician Orders: PT Eval and Treat No inner ear problems. Balance Retraining therapy  Medical Diagnosis from Referral: Imbalance  Evaluation Date: 10/24/2019  Authorization Period Expiration: 12-31-19  Plan of Care Expiration: 12-19-19  Visit # / Visits authorized: 2 / 20 (+1 from prior authorization)  MD Follow up appointment: none scheduled     Time In: 2:05  Time Out: 3:00  Total Billable Time: 55 minutes     Precautions: Standard migraines, Graves disease    Subjective     Pt reports: she tolerated treatment well overall last time but had increased stiffness in the neck and shoulders and a little soreness in the legs and hips.   She was compliant with home exercise program.  Response to previous treatment: muscle soreness, stiffness in the neck  Functional change: none noted    Pain: 0/10  Location: bilateral LEs      Objective     Asiya received therapeutic exercises to develop strength, endurance, flexibility, posture and core stabilization for 38 minutes including:  Supine march with core tight x 10 each  Supine hip abd RTB with eccentric control into adduction 2 x 10  Bridge with RTB for hip abd reese 2 x 10   SLR x 10 each   Sidelying Clams x 10  Leg lift prone alternate x 10  Hip ext prone with bent knee x 10      Asiya participated in neuromuscular re-education activities to improve: Balance, Coordination, Proprioception and Posture for 17 minutes. The following activities were included:  Standing march on airex foam x 20 each, alternating, without UE assist  Single leg balance x 30 sec - occasional UE assist to prevent LOB, SBA  Modified tandem stand 2 x 30" each, second round slightly closer together  (NP) Tandem balance x 20" each, more " difficult with L LE posterior, CGA  Balance with narrow ZULMA and eyes closed x 30, CGA        Home Exercises Provided and Patient Education Provided     Education provided:   - education on performing exercises with range of the muscles working to prevent compensation with other muscle groups  - rationale of proprioception and balance training for decreased shaking and jerking    Written Home Exercises Provided: yes. Pt also given red band for use with exercises at home  Exercises were reviewed and Asiya was able to demonstrate them prior to the end of the session.  Asiya demonstrated good  understanding of the education provided.     See EMR under Patient Instructions for exercises provided 10-24-19, 10-28-19, 10-31-19.    Assessment     Asiya presents with improved tolerance to treatment this date and is able to complete additional exercises. Improved stability with balance activities, decreased LOB with SLS and modified tandem balance. Did not perform sharp rhomberg balance this date due to the greater onset of shaking and dizziness at previous session with that exercise. Greater awareness of core stabilization with exercises as well.  Asiya is progressing well towards her goals.   Pt prognosis is Good.     Pt will continue to benefit from skilled outpatient physical therapy to address the deficits listed in the problem list box on initial evaluation, provide pt/family education and to maximize pt's level of independence in the home and community environment.     Pt's spiritual, cultural and educational needs considered and pt agreeable to plan of care and goals.     Anticipated Barriers for therapy: Graves disease     Goals:   Short Term Goals:  4 weeks  Increase strength 1/2 muscle grade  Be able to perform HEP with minimal cueing required  Improve Romano Balance score  to 53     Long Term Goals: 8 weeks  Improve muscle strength 1 muscle grade  Restore ability to ambulate with normal gait pattern at all  times  Walking for ADL and exercise will be restored without instability  Restore ability to stand for ADL without instability  Restore ability to perform ADL's and household activities independently and without instability  Improve Romano Balance score  to 54    Plan     Continue to progress B hip strength as well as proprioception and balance with standing static and dynamic activities. Progress modified tandem stance to true sharp rhomberg as able over the next few sessions.    Paola Toscano, PTA

## 2019-11-05 NOTE — TELEPHONE ENCOUNTER
Initial Repatha 140mg/ml [employer] PENs consult completed on 19 at 1:15pm (pt requested callback in 10 minutes when called at 1pm), as scheduled over the phone. Repatha 140mg/ml Sureclick PENs will be shipped on 19 to arrive at patient's home on 19 via [employer]. $ 8.50 copay ([employer]). Patient will start Repatha 140mg/ml Sureclick PENs on  19. Address confirmed. Confirmed 2 patient identifiers - name and . Therapy Appropriate.  --Injection experience: none. She did review the instructional PDF (she learns better like that) and she feels very comfortable with proceeding. She is still nervous about a self injection but has her cousin, Juanita, who has experience staying with her right now to assist as well.    Counseled patient on administration directions:  - Inject 140mg (1 pen) into the skin every 14 days.   - Take out of the refrigerator 30-60 minutes prior to injection.  - Wash hands before and after injection.  - Monthly RX will come with gauze, bandaids, and alcohol swabs.  - Patient may inject in either the tops of the thighs, abdomen- but at least 2 inches away from her belly button, or the outer part of her upper arm.  Patient was instructed to rotate injections sites.  - Patient is to wipe down the injection site with the alcohol pad, wait to dry.  Gently squeeze the area of the cleaned skin and hold it firmly.   Place the pen flat against the raised area of skin that is being squeezed, then push down on the button and release,  in 10-15 seconds you will hear a click and the window will go from from clear to yellow, indicating injection is complete.  - Patient should rotate injection sites.   - Patient will use sharps container; once full, per [place] law, she/ he may lock the sharps container and place in her trash. She/ he can then contact the Pharmacy and we will replace the sharps at no additional charge.    Patient was counseled on possible side effects:  - Injection site reaction: redness,  "soreness, itching, bruising, which should resolve within 3-5 days.  - flu-like symptoms    DDIs: Medication list reviewed with patient - no DDIs. Patient advised to call [facility] prior to starting any new medications - OTC, Rx or herbal.  - Patient to continue Crestor 40mg until further notice by provider; perfectly safe to take WITH Repatha.    Storage: advised to keep refrigerated for stability until expiration on the box, but room temperature is ok if used within 30 days. Pt advised to keep in the fridge in an area that will not freeze or get too warm.    Diet, smoking cessation and exercise recommendations offered, but patient declines. Patient says she is as "natural" as they come. She eats well and stays active, her cholesterol his hereditary. High LDL (363mg/dL) discussed and she understands this will help reduce her LDL and future cardiac risks greatly so she is committed to starting treatment.    Patient did not have any further questions. She was advised to keep a calendar to stay compliant. Consultation included: indication; goals of treatment; administration; storage and handling; side effects; how to handle side effects; the importance of compliance; how to handle missed doses; the importance of laboratory monitoring; the importance of keeping all follow up appointments.  Patient understands to report any medication changes to [facility] and provider. All questions answered and addressed to patients satisfaction. I will f/u with her in 1 week from start, [facility] to contact patient in 3 weeks for refills.     "

## 2019-11-07 ENCOUNTER — TELEPHONE (OUTPATIENT)
Dept: REHABILITATION | Facility: HOSPITAL | Age: 64
End: 2019-11-07

## 2019-11-11 ENCOUNTER — CLINICAL SUPPORT (OUTPATIENT)
Dept: REHABILITATION | Facility: HOSPITAL | Age: 64
End: 2019-11-11
Payer: MEDICARE

## 2019-11-11 DIAGNOSIS — M62.81 MUSCLE WEAKNESS: ICD-10-CM

## 2019-11-11 DIAGNOSIS — R26.81 GAIT INSTABILITY: Primary | ICD-10-CM

## 2019-11-11 PROCEDURE — 97110 THERAPEUTIC EXERCISES: CPT | Mod: HCNC,PN | Performed by: PHYSICAL THERAPIST

## 2019-11-11 NOTE — PROGRESS NOTES
Physical Therapy Daily Treatment Note     Name: Jackie Vanessa  Clinic Number: [id number]    Therapy Diagnosis:   Encounter Diagnoses   Name Primary?    Gait instability Yes    Muscle weakness      Physician: Marley Johnson NP    Visit Date: 11/11/2019    Physician Orders: PT Eval and Treat No inner ear problems. Balance Retraining therapy  Medical Diagnosis from Referral: Imbalance  Evaluation Date: 10/24/2019  Authorization Period Expiration: 12-31-19  Plan of Care Expiration: 12-19-19  Visit # / Visits authorized: 4 / 20   MD Follow up appointment: none scheduled     Time In: 3:02  Time Out: 4:02  Total Billable Time: 55 minutes     Precautions: Standard migraines, Graves disease    Subjective     Pt reports: she tripped when walking on [address] and landed on knees and hands and was very sore Pt states her shoulders were getting sore from ex and she got her cousin to help her with ex and no c/o soreness now.    She was compliant with home exercise program.  Response to previous treatment: muscle soreness, stiffness in the neck  Functional change: feeling more steady with balance    Pain: 0/10  Location: bilateral LEs      Objective     Asiya received therapeutic exercises to develop strength, endurance, flexibility, posture and core stabilization for 30 minutes including:  Supine march with core tight x 20 each  Supine hip abd RTB with eccentric control into adduction 2 x 10  Bridge with RTB for hip abd reese 2 x 10  SLR x 10 each  Sidelying Clams x 15   Hip abd sidelying x 10  Leg lift prone alternate x 10  Hip ext prone with bent knee x 10  Pt states she lost first day exercises and just recently found them, fatigue noted at end of 10 reps      Asiya participated in neuromuscular re-education activities to improve: Balance, Coordination, Proprioception and Posture for 25 minutes. The following activities were included:    Single leg balance x 30 sec B - occasional UE assist to prevent LOB, SBA  Tandem  balance x 30 sec each, CGA at times, worked on placing foot I in front of other without holding on to rail  Balance with narrow ZULMA and eyes closed x 30 sec, SBA 3 episodes of jerky movements, but recovered I    Foam walk x 2 minute  without UE assist   Standing on foam x 30 sec x 1   Standing on foam with feet together x 30 sec x 1   Standing on foam with feet together with eyes closed x 30 sec    Wobble board x 10 x 1 min balance x 2 planes      Home Exercises Provided and Patient Education Provided     Education provided:   - HEP   - how improved strength can improve balance    Written Home Exercises Provided: yes.   Exercises were reviewed and Asiya was able to demonstrate them prior to the end of the session.  Asiya demonstrated good  understanding of the education provided.     See EMR under Patient Instructions for exercises provided 10-24-19, 10-28-19, 10-31-19, 11-11-19.    Assessment   Pt reports improved balance.  Pt with improved balance as compared to last visit and able to further progress balance work in standing along with further progression with strengthening, pt with fatigue at end    Asiya is progressing well towards her goals.   Pt prognosis is Good.     Pt will continue to benefit from skilled outpatient physical therapy to address the deficits listed in the problem list box on initial evaluation, provide pt/family education and to maximize pt's level of independence in the home and community environment.     Pt's spiritual, cultural and educational needs considered and pt agreeable to plan of care and goals.     Anticipated Barriers for therapy: Graves disease     Goals:   Short Term Goals:  4 weeks  Increase strength 1/2 muscle grade  Be able to perform HEP with minimal cueing required  Improve Romano Balance score  to 53     Long Term Goals: 8 weeks  Improve muscle strength 1 muscle grade  Restore ability to ambulate with normal gait pattern at all times  Walking for ADL and exercise will  be restored without instability  Restore ability to stand for ADL without instability  Restore ability to perform ADL's and household activities independently and without instability  Improve Romano Balance score  to 54    Plan     Continue to progress B hip strength as well as proprioception and balance with standing static and dynamic activities .    Jackie Nieves, PT

## 2019-11-14 ENCOUNTER — CLINICAL SUPPORT (OUTPATIENT)
Dept: REHABILITATION | Facility: HOSPITAL | Age: 64
End: 2019-11-14
Payer: MEDICARE

## 2019-11-14 DIAGNOSIS — R26.81 GAIT INSTABILITY: Primary | ICD-10-CM

## 2019-11-14 DIAGNOSIS — M62.81 MUSCLE WEAKNESS: ICD-10-CM

## 2019-11-14 PROCEDURE — 97112 NEUROMUSCULAR REEDUCATION: CPT | Mod: HCNC,PN | Performed by: PHYSICAL THERAPIST

## 2019-11-14 NOTE — PROGRESS NOTES
Physical Therapy Daily Treatment Note     Name: Jackie Vanessa  Clinic Number: [id number]    Therapy Diagnosis:   Encounter Diagnoses   Name Primary?    Gait instability Yes    Muscle weakness      Physician: Marley Johnson NP    Visit Date: 11/14/2019    Physician Orders: PT Eval and Treat No inner ear problems. Balance Retraining therapy  Medical Diagnosis from Referral: Imbalance  Evaluation Date: 10/24/2019  Authorization Period Expiration: 12-31-19  Plan of Care Expiration: 12-19-19  Visit # / Visits authorized: 5/ 20   MD Follow up appointment: none scheduled     Time In: 3:05  Time Out: 3:50  Total Billable Time: 30 minutes     Precautions: Standard migraines, Graves disease    Subjective     Pt reports: no falls  Was sore in hips after last treatment still some soreness today and after treatment  She was compliant with home exercise program.  Response to previous treatment: muscle soreness,   Functional change: feeling more steady with balance    Pain: 0/10  Location: bilateral LEs      Objective     Asiya received therapeutic exercises to develop strength, endurance, flexibility, posture and core stabilization for 5 direct minutes including:  Supine march with core tight x 20 each  Supine hip abd RTB with eccentric control into adduction 2 x 10  Bridge with RTB for hip abd reese 2 x 10  SLR x 15 each   Piriformis stretch x 10  Sidelying Clams x 15  Hip abd sidelying x 10  Leg lift prone alternate x 10  Hip ext prone with bent knee x 10  Pt states she lost first day exercises and just recently found them, fatigue noted at end of 10 reps      Asiya participated in neuromuscular re-education activities to improve: Balance, Coordination, Proprioception and Posture for 25 minutes. The following activities were included:    Single leg balance x 30 sec B - occasional UE assist to prevent LOB, SBA  Tandem balance x 30 sec each, CGA at times, worked on placing foot I in front of other without holding on to  rail  Balance with narrow ZULMA and eyes closed x 30 sec, SBA no episodes of jerky movements    Foam walk x 2 minute  without UE assist  Standing on foam x 30 sec x 1  Standing on foam with feet together x 30 sec x 1  Standing on foam with feet together with eyes closed x 30 sec  One episode of severe jerking   Wobble board x 10 x 1 min balance x 2 planes      Home Exercises Provided and Patient Education Provided     Education provided:   - HEP   - how improved strength can improve balance    Written Home Exercises Provided: yes.   Exercises were reviewed and Asiya was able to demonstrate them prior to the end of the session.  Asiya demonstrated good  understanding of the education provided.     See EMR under Patient Instructions for exercises provided 10-24-19, 10-28-19, 10-31-19, 11-11-19, 11-14-19.    Assessment   Pt reports improved balance.  Pt with further improved balance as compared to last visit and able to further progress balance work in standing along with further progression with strengthening, pt with fatigue at end again and with c/o soreness added stretching     Asiya is progressing well towards her goals.   Pt prognosis is Good.     Pt will continue to benefit from skilled outpatient physical therapy to address the deficits listed in the problem list box on initial evaluation, provide pt/family education and to maximize pt's level of independence in the home and community environment.     Pt's spiritual, cultural and educational needs considered and pt agreeable to plan of care and goals.     Anticipated Barriers for therapy: Graves disease     Goals:   Short Term Goals:  4 weeks  Increase strength 1/2 muscle grade  Be able to perform HEP with minimal cueing required  Improve Romano Balance score  to 53     Long Term Goals: 8 weeks  Improve muscle strength 1 muscle grade  Restore ability to ambulate with normal gait pattern at all times  Walking for ADL and exercise will be restored without  instability  Restore ability to stand for ADL without instability  Restore ability to perform ADL's and household activities independently and without instability  Improve Romano Balance score  to 54    Plan     Continue to progress B hip strength as well as proprioception and balance with standing static and dynamic activities .    Jackie Nieves, PT

## 2019-11-18 ENCOUNTER — CLINICAL SUPPORT (OUTPATIENT)
Dept: REHABILITATION | Facility: HOSPITAL | Age: 64
End: 2019-11-18
Payer: MEDICARE

## 2019-11-18 DIAGNOSIS — R26.81 GAIT INSTABILITY: Primary | ICD-10-CM

## 2019-11-18 DIAGNOSIS — M62.81 MUSCLE WEAKNESS: ICD-10-CM

## 2019-11-18 PROCEDURE — 97112 NEUROMUSCULAR REEDUCATION: CPT | Mod: HCNC,PN

## 2019-11-18 PROCEDURE — 97110 THERAPEUTIC EXERCISES: CPT | Mod: HCNC,PN

## 2019-11-18 NOTE — PROGRESS NOTES
Physical Therapy Daily Treatment Note     Name: Jackie Vanessa  Clinic Number: [id number]    Therapy Diagnosis:   Encounter Diagnoses   Name Primary?    Gait instability Yes    Muscle weakness      Physician: Marley Johnson NP    Visit Date: 11/18/2019    Physician Orders: PT Eval and Treat No inner ear problems. Balance Retraining therapy  Medical Diagnosis from Referral: Imbalance  Evaluation Date: 10/24/2019  Authorization Period Expiration: 12-31-19  Plan of Care Expiration: 12-19-19  Visit # / Visits authorized: 7/ 20   MD Follow up appointment: none scheduled     Time In: 3:05  Time Out: 3:50  Total Billable Time: 30 minutes     Precautions: Standard migraines, Graves disease    Subjective     Pt reports: having more dizziness today not sure why. .  Pt took a long walk yesterday and did well with no falls  She was compliant with home exercise program.  Response to previous treatment: muscle soreness,   Functional change: feeling more steady with balance    Pain: 0/10  Location: bilateral LEs      Objective     Asiya received therapeutic exercises to develop strength, endurance, flexibility, posture and core stabilization for 5 direct minutes including:  Supine march with core tight x 20 each  Supine hip abd RTB with eccentric control into adduction 2 x 10  Bridge with RTB for hip abd reese 2 x 10  SLR x 15 each   Piriformis stretch x 10  Sidelying Clams x 15  Hip abd sidelying x 10  Leg lift prone alternate x 10  Hip ext prone with bent knee x 10  Pt states she lost first day exercises and just recently found them, fatigue noted at end of 10 reps      Asiya participated in neuromuscular re-education activities to improve: Balance, Coordination, Proprioception and Posture for 25 minutes. The following activities were included:    Single leg balance x 30 sec B - occasional UE assist to prevent LOB, SBA  Tandem balance x 30 sec each, CGA at times, worked on placing foot I in front of other without holding on  to rail  Balance with narrow ZULMA and eyes closed x 30 sec, SBA no episodes of jerky movements    Foam walk x 2 minute  without UE assist  Standing on foam x 30 sec x 1  Standing on foam with feet together x 30 sec x 1  Standing on foam with feet together with eyes closed x 30 sec  One episode of severe jerking   Wobble board x 10 x 1 min balance x 2 planes      Home Exercises Provided and Patient Education Provided     Education provided:   - HEP   - how improved strength can improve balance    Written Home Exercises Provided: yes.   Exercises were reviewed and Asiya was able to demonstrate them prior to the end of the session.  Asiya demonstrated good  understanding of the education provided.     See EMR under Patient Instructions for exercises provided 10-24-19, 10-28-19, 10-31-19, 11-11-19, 11-14-19.    Assessment   Pt reports improved balance.  Pt with further improved balance as compared to last visit and able to further progress balance work in standing along with further progression with strengthening, pt with fatigue at end again and with c/o soreness added stretching     Asiya is progressing well towards her goals.   Pt prognosis is Good.     Pt will continue to benefit from skilled outpatient physical therapy to address the deficits listed in the problem list box on initial evaluation, provide pt/family education and to maximize pt's level of independence in the home and community environment.     Pt's spiritual, cultural and educational needs considered and pt agreeable to plan of care and goals.     Anticipated Barriers for therapy: Graves disease     Goals:   Short Term Goals:  4 weeks  Increase strength 1/2 muscle grade  Be able to perform HEP with minimal cueing required  Improve Romano Balance score  to 53     Long Term Goals: 8 weeks  Improve muscle strength 1 muscle grade  Restore ability to ambulate with normal gait pattern at all times  Walking for ADL and exercise will be restored without  instability  Restore ability to stand for ADL without instability  Restore ability to perform ADL's and household activities independently and without instability  Improve Romano Balance score  to 54    Plan     Continue to progress B hip strength as well as proprioception and balance with standing static and dynamic activities .    Jackie Nieves, PT

## 2019-11-18 NOTE — PROGRESS NOTES
Physical Therapy Daily Treatment Note      Name: Jackie Vanessa  Clinic Number: [id number]     Therapy Diagnosis:        Encounter Diagnoses   Name Primary?    Gait instability Yes    Muscle weakness        Physician: Marley Johnson NP     Visit Date: 11/18/2019     Physician Orders: PT Eval and Treat No inner ear problems. Balance Retraining therapy  Medical Diagnosis from Referral: Imbalance  Evaluation Date: 10/24/2019  Authorization Period Expiration: 12-31-19  Plan of Care Expiration: 12-19-19  Visit # / Visits authorized: 7/ 20   MD Follow up appointment: none scheduled     Time In: 3:05  Time Out: 4:05  Total Billable Time: 60 minutes     Precautions: Standard migraines, Graves disease     Subjective      Pt reports: pt states she was getting tired yesterday after walking 3/4 of a mile.  Pt states she started having increased dizziness yesterday after her walk.  She states she is having more dizziness today not sure why.  Pt reports dizziness with rolling to both sides in bed.  Pt took a long walk yesterday and did well with no falls  She was compliant with home exercise program.  Response to previous treatment: muscle soreness,   Functional change: feeling more steady with balance     Pain: 0/10  Location: bilateral LEs       Objective      Seated BP: 129/78, HR: 76    Smooth pursuit: 2 beat nystagmus with end range of L gaze  VOR: WNL  2 targets: WNL for vertical and horizontal    Asiya received therapeutic exercises to develop strength, endurance, flexibility, posture and core stabilization for 45 direct minutes including:  Supine march with core tight x 20 each  Supine hip abd RTB with eccentric control into adduction 2 x 10  Bridge with RTB for hip abd reese 2 x 10  SLR x 15 each  Piriformis stretch x 10  Sidelying Clams x 15 (vc for TA set)  Hip abd sidelying x 10  Leg lift prone alternate x 10  Hip ext prone with bent knee x 10   Sit to stand x10 from chair without UE support        Asiya  "participated in neuromuscular re-education activities to improve: Balance, Coordination, Proprioception and Posture for 15 minutes. The following activities were included:  Single leg balance x 30 sec B - occasional UE assist to prevent LOB, SBA  Tandem balance x 30 sec each, CGA at times, worked on placing foot I in front of other without holding on to rail  Balance with narrow ZULMA and eyes closed x 30 sec, SBA no episodes of jerky movements  Standing on foam with feet together with eyes closed x 30 sec  One episode of severe jerking   Wobble board x 1 min balance x 2 planes (CGA as needed)    Not performed today due to time constraint:  Foam walk x 2 minute  without UE assist  Standing on foam x 30 sec x 1  Standing on foam with feet together x 30 sec x 1        Home Exercises Provided and Patient Education Provided      Education provided:   - HEP   - how improved strength can improve balance     Written Home Exercises Provided: pt instructed to continue previous HEP   Exercises were reviewed and Asiya was able to demonstrate them prior to the end of the session.  Asiya demonstrated good  understanding of the education provided.      See EMR under Patient Instructions for exercises provided 10-24-19, 10-28-19, 10-31-19, 11-11-19, 11-14-19.     Assessment   Pt presented with increased overall "dizziness" today.  She reported increased dizziness with rolling on mat, which subsided after rest break.  Pt was able to perform all standing exercises without dizziness.  She was able to step into tandem stance without UE support and balance without jerking motions.  Wobble board continues to remain most challenging requiring CGA at times for balance.     Asiya is progressing well towards her goals.   Pt prognosis is Good.      Pt will continue to benefit from skilled outpatient physical therapy to address the deficits listed in the problem list box on initial evaluation, provide pt/family education and to maximize pt's " level of independence in the home and community environment.      Pt's spiritual, cultural and educational needs considered and pt agreeable to plan of care and goals.     Anticipated Barriers for therapy: Graves disease      Goals:   Short Term Goals:  4 weeks (progressing)  Increase strength 1/2 muscle grade  Be able to perform HEP with minimal cueing required  Improve Romano Balance score  to 53     Long Term Goals: 8 weeks (progressing)  Improve muscle strength 1 muscle grade  Restore ability to ambulate with normal gait pattern at all times  Walking for ADL and exercise will be restored without instability  Restore ability to stand for ADL without instability  Restore ability to perform ADL's and household activities independently and without instability  Improve Romano Balance score  to 54     Plan      Continue to progress B hip strength as well as proprioception and balance with standing static and dynamic activities .

## 2019-11-21 ENCOUNTER — CLINICAL SUPPORT (OUTPATIENT)
Dept: REHABILITATION | Facility: HOSPITAL | Age: 64
End: 2019-11-21
Payer: MEDICARE

## 2019-11-21 DIAGNOSIS — M62.81 MUSCLE WEAKNESS: ICD-10-CM

## 2019-11-21 DIAGNOSIS — R26.81 GAIT INSTABILITY: Primary | ICD-10-CM

## 2019-11-21 PROCEDURE — 97110 THERAPEUTIC EXERCISES: CPT | Mod: HCNC,PN

## 2019-11-21 PROCEDURE — 97112 NEUROMUSCULAR REEDUCATION: CPT | Mod: HCNC,PN

## 2019-11-21 NOTE — PROGRESS NOTES
Physical Therapy Daily Treatment Note      Name: Jackie Vanessa  Clinic Number: [id number]     Therapy Diagnosis:  1. Gait instability     2. Muscle weakness         Physician: Marley Johnson NP     Visit Date: 11/18/2019     Physician Orders: PT Eval and Treat No inner ear problems. Balance Retraining therapy  Medical Diagnosis from Referral: Imbalance  Evaluation Date: 10/24/2019  Authorization Period Expiration: 12-31-19  Plan of Care Expiration: 12-19-19  Visit # / Visits authorized: 8 / 20   MD Follow up appointment: none scheduled     Time In: 3:03  Time Out: 4:00  Total Billable Time: 57minutes     Precautions: Standard migraines, Graves disease     Subjective      Pt reports: having deep lateral hip pain after she left the last couple of sessions, unsure why it is happening whether it is from stomach or side lying exercises but is hesitant to continue doing them. States the dizziness has been minimal lately. Was able to take a pretty good walk yesterday.  She was compliant with home exercise program.  Response to previous treatment: muscle soreness,   Functional change: feeling more steady with balance     Pain: 0/10  Location: bilateral LEs       Objective      Seated BP: 129/78, HR: 76    Smooth pursuit: 2 beat nystagmus with end range of L gaze  VOR: WNL  2 targets: WNL for vertical and horizontal    Asiya received therapeutic exercises to develop strength, endurance, flexibility, posture and core stabilization for 42 direct minutes including:  Supine march with core tight x 20 each  Supine hip abd RTB with eccentric control into adduction 2 x 10  Bridge with RTB for hip abd reese 2 x 10  SLR x 20 each  Piriformis stretch x 10  Sidelying Clams x 15 (vc for TA set) - greater fatigue on L hip/glute  Hip abd sidelying x 10 - cues to prevent TFL compensation  Leg lift prone alternate x 10  Hip ext prone with bent knee x 10 (only 8 on L LE due to fatigue)  Sit to stand x10 from chair without UE  support        Asiya participated in neuromuscular re-education activities to improve: Balance, Coordination, Proprioception and Posture for 15 minutes. The following activities were included:  Single leg balance x 30 sec B - occasional UE assist to prevent LOB, SBA  Tandem balance x 30 sec each, SBA at times, worked on placing foot in front of other without holding on to rail   Tandem walk x 1 lap  Balance with narrow ZULMA and eyes closed x 30 sec, SBA steady  Standing on foam with feet together with eyes closed 2 x 30 sec  One episode of severe jerking   Wobble board x 1 min balance x 2 planes (CGA as needed)    Not performed today due to time constraint:  Foam walk x 2 minute  without UE assist  Standing on foam x 30 sec x 1  Standing on foam with feet together x 30 sec x 1        Home Exercises Provided and Patient Education Provided      Education provided:   - HEP   - how improved strength can improve balance     Written Home Exercises Provided: pt instructed to continue previous HEP   Exercises were reviewed and Asiya was able to demonstrate them prior to the end of the session.  Asiya demonstrated good  understanding of the education provided.      See EMR under Patient Instructions for exercises provided 10-24-19, 10-28-19, 10-31-19, 11-11-19, 11-14-19.     Assessment   Asiya tolerated treatment well this date with improved stability with balance activities. Minimal to no sway with tandem and narrow ZULMA balance exercises this date, able to perform dynamic tandem walk with min use of UE support in // bars. No reports of dizziness throughout session. Increased fatigue noted with prone and side lying exercises, patient further instructed on importance of rest when onset of fatigue occurs and not force to finish given number of reps.      Asiya is progressing well towards her goals.   Pt prognosis is Good.      Pt will continue to benefit from skilled outpatient physical therapy to address the deficits  listed in the problem list box on initial evaluation, provide pt/family education and to maximize pt's level of independence in the home and community environment.      Pt's spiritual, cultural and educational needs considered and pt agreeable to plan of care and goals.     Anticipated Barriers for therapy: Graves disease      Goals:   Short Term Goals:  4 weeks (progressing)  Increase strength 1/2 muscle grade  Be able to perform HEP with minimal cueing required  Improve Romano Balance score  to 53     Long Term Goals: 8 weeks (progressing)  Improve muscle strength 1 muscle grade  Restore ability to ambulate with normal gait pattern at all times  Walking for ADL and exercise will be restored without instability  Restore ability to stand for ADL without instability  Restore ability to perform ADL's and household activities independently and without instability  Improve Romano Balance score  to 54     Plan      Continue to progress B hip strength as well as proprioception and balance with standing static and dynamic activities .

## 2019-11-22 ENCOUNTER — PATIENT OUTREACH (OUTPATIENT)
Dept: ADMINISTRATIVE | Facility: HOSPITAL | Age: 64
End: 2019-11-22

## 2019-12-04 ENCOUNTER — TELEPHONE (OUTPATIENT)
Dept: PHARMACY | Facility: CLINIC | Age: 64
End: 2019-12-04

## 2019-12-04 NOTE — TELEPHONE ENCOUNTER
Patient reached in regards to Repatha. She confirms two patient identifiers - name + . She confirms taking her 1st dose on 19 and dosing every other Friday evening. She denies any side effects - initially she thought she had some tenderness at the injection site/leg, but realized that it was actually because she had a splinter from a thorn; pain resolved once removed. She has no further questions or concerns. Refill completed. Patient has 0 doses remaining, next dose needed by 19. [facility] to ship out Repatha on 19 to arrive at patients home on 19 via [employer] - pending refill request. Address confirmed (Notes to [employer]: none, came fine last month), $8.50 Copay ([facility]), Supplies needed: no additional supplies needed at this time. Patient denies starting any new medications, having any new diagnoses or allergies, side effects, or missing any doses. All questions answered to patients satisfaction. [facility] will continue to reach out to patient monthly for refills. CYNDI

## 2019-12-23 ENCOUNTER — TELEPHONE (OUTPATIENT)
Dept: ADMINISTRATIVE | Facility: HOSPITAL | Age: 64
End: 2019-12-23

## 2019-12-23 ENCOUNTER — OFFICE VISIT (OUTPATIENT)
Dept: FAMILY MEDICINE | Facility: CLINIC | Age: 64
End: 2019-12-23
Payer: MEDICARE

## 2019-12-23 VITALS
WEIGHT: 126.63 LBS | SYSTOLIC BLOOD PRESSURE: 102 MMHG | HEART RATE: 88 BPM | DIASTOLIC BLOOD PRESSURE: 72 MMHG | BODY MASS INDEX: 21.1 KG/M2 | TEMPERATURE: 98 F | HEIGHT: 65 IN

## 2019-12-23 DIAGNOSIS — R39.9 UTI SYMPTOMS: Primary | ICD-10-CM

## 2019-12-23 DIAGNOSIS — N39.0 URINARY TRACT INFECTION WITHOUT HEMATURIA, SITE UNSPECIFIED: ICD-10-CM

## 2019-12-23 PROCEDURE — 3008F BODY MASS INDEX DOCD: CPT | Mod: HCNC,CPTII,S$GLB, | Performed by: NURSE PRACTITIONER

## 2019-12-23 PROCEDURE — 99214 PR OFFICE/OUTPT VISIT, EST, LEVL IV, 30-39 MIN: ICD-10-PCS | Mod: HCNC,25,S$GLB, | Performed by: NURSE PRACTITIONER

## 2019-12-23 PROCEDURE — 99999 PR PBB SHADOW E&M-EST. PATIENT-LVL III: CPT | Mod: PBBFAC,HCNC,, | Performed by: NURSE PRACTITIONER

## 2019-12-23 PROCEDURE — 99999 PR PBB SHADOW E&M-EST. PATIENT-LVL III: ICD-10-PCS | Mod: PBBFAC,HCNC,, | Performed by: NURSE PRACTITIONER

## 2019-12-23 PROCEDURE — 3008F PR BODY MASS INDEX (BMI) DOCUMENTED: ICD-10-PCS | Mod: HCNC,CPTII,S$GLB, | Performed by: NURSE PRACTITIONER

## 2019-12-23 PROCEDURE — 99214 OFFICE O/P EST MOD 30 MIN: CPT | Mod: HCNC,25,S$GLB, | Performed by: NURSE PRACTITIONER

## 2019-12-23 PROCEDURE — 81002 POCT URINE DIPSTICK WITHOUT MICROSCOPE: ICD-10-PCS | Mod: HCNC,S$GLB,, | Performed by: NURSE PRACTITIONER

## 2019-12-23 PROCEDURE — 81002 URINALYSIS NONAUTO W/O SCOPE: CPT | Mod: HCNC,S$GLB,, | Performed by: NURSE PRACTITIONER

## 2019-12-23 RX ORDER — FLUCONAZOLE 150 MG/1
150 TABLET ORAL DAILY
Qty: 2 TABLET | Refills: 0 | Status: SHIPPED | OUTPATIENT
Start: 2019-12-23 | End: 2020-04-09

## 2019-12-23 RX ORDER — SULFAMETHOXAZOLE AND TRIMETHOPRIM 800; 160 MG/1; MG/1
1 TABLET ORAL 2 TIMES DAILY
Qty: 10 TABLET | Refills: 0 | Status: SHIPPED | OUTPATIENT
Start: 2019-12-23 | End: 2019-12-28

## 2019-12-23 NOTE — LETTER
AUTHORIZATION FOR RELEASE OF   CONFIDENTIAL INFORMATION    Dear Release of Information,    We are seeing Jackie Vanessa, date of birth 1955, in the clinic at [facility]. Drew Grant MD is the patient's PCP. Jackie Vanessa has an outstanding lab/procedure at the time we reviewed her chart. In order to help keep her health information updated, she has authorized us to request the following medical record(s):                                             ( X )  COLONOSCOPY              Please fax records to [facility], Zachary C Pray, MD, [phone]     If you have any questions, please contact [facility] Clinical Care Coordinator at [phone].           Patient Name: Jackie Vanessa  : 1955  Patient Phone #: [phone]

## 2019-12-23 NOTE — PROGRESS NOTES
This dictation has been generated using Modal Fluency Dictation some phonetic errors may occur. Please contact author for clarification if needed.     Problem List Items Addressed This Visit     None      Visit Diagnoses     UTI symptoms    -  Primary    Relevant Orders    POCT urine dipstick without microscope    Urinary tract infection without hematuria, site unspecified            Orders Placed This Encounter    POCT urine dipstick without microscope    sulfamethoxazole-trimethoprim 800-160mg (BACTRIM DS) 800-160 mg Tab    fluconazole (DIFLUCAN) 150 MG Tab       uti symptom. U/a leukocytes positive. Bactrim  Empiric therapy for diflucan    No follow-ups on file.    ________________________________________________________________  ________________________________________________________________      Chief Complaint   Patient presents with    pain/burning when urinating    urine odor     History of present illness  This 64 y.o. presents today for complaint of UTI symptoms.  Patient notes burning with urination.  Symptoms have been present for a few days.  Denies any blood in urine.  Notes recent antibiotics from dentists and recently noted some vaginal itching.  No discharge.  Review of systems  No fever chills malaise body aches  No abdominal pain  No back pain or flank pain    Past medical and social history reviewed.  Needs to cut caffeine intake at about 6 cups of coffee per day        Past Medical History:   Diagnosis Date    Abnormal colonoscopy 3/21/2019    Colon polyp at [facility] 2018 repeat 5 years.     Graves' disease 3/21/2019    Hyperlipidemia, mixed 3/21/2019    Hypothyroidism (acquired) 3/21/2019    Lyme disease        History reviewed. No pertinent surgical history.    History reviewed. No pertinent family history.    Social History     Socioeconomic History    Marital status:      Spouse name: Not on file    Number of children: Not on file    Years of education: Not on file    Highest  education level: Not on file   Occupational History    Not on file   Social Needs    Financial resource strain: Not on file    Food insecurity:     Worry: Not on file     Inability: Not on file    Transportation needs:     Medical: Not on file     Non-medical: Not on file   Tobacco Use    Smoking status: Current Every Day Smoker     Packs/day: 0.50     Years: 48.00     Pack years: 24.00     Types: Cigarettes    Smokeless tobacco: Never Used   Substance and Sexual Activity    Alcohol use: Not on file    Drug use: Not on file    Sexual activity: Not on file   Lifestyle    Physical activity:     Days per week: Not on file     Minutes per session: Not on file    Stress: Not on file   Relationships    Social connections:     Talks on phone: Not on file     Gets together: Not on file     Attends [employer] service: Not on file     Active member of club or organization: Not on file     Attends meetings of clubs or organizations: Not on file     Relationship status: Not on file   Other Topics Concern    Not on file   Social History Narrative    Not on file       Current Outpatient Medications   Medication Sig Dispense Refill    cetirizine (ZYRTEC) 10 MG tablet Take 10 mg by mouth once daily.      escitalopram oxalate (LEXAPRO) 10 MG tablet Take 1 tablet (10 mg total) by mouth once daily. 30 tablet 11    evolocumab (REPATHA SURECLICK) 140 mg/mL PnIj Inject 1 mL (140 mg total) into the skin every 14 (fourteen) days. 2 mL 12    hydrOXYzine HCl (ATARAX) 25 MG tablet TAKE 1 TABLET THREE TIMES DAILY AS NEEDED FOR  ITCHING 90 tablet 0    levothyroxine 125 mcg Cap Take 125 mcg by mouth once daily. 90 capsule 1    rosuvastatin (CRESTOR) 40 MG Tab Take 1 tablet (40 mg total) by mouth every evening. 90 tablet 4    fluconazole (DIFLUCAN) 150 MG Tab Take 1 tablet (150 mg total) by mouth once daily. 2 tablet 0    sulfamethoxazole-trimethoprim 800-160mg (BACTRIM DS) 800-160 mg Tab Take 1 tablet by mouth 2 (two) times  daily. for 5 days 10 tablet 0     No current facility-administered medications for this visit.        Review of patient's allergies indicates:  No Known Allergies    Physical examination  Vitals Reviewed  Gen. Well-dressed well-nourished   Skin warm dry and intact.  No rashes noted.  Neuro. Awake alert oriented x4.  Normal judgment and cognition noted.  Extremities no clubbing cyanosis or edema noted.     Call or return to clinic prn if these symptoms worsen or fail to improve as anticipated.

## 2019-12-23 NOTE — TELEPHONE ENCOUNTER
----- Message from Jacqui Rucker sent at 12/23/2019 10:53 AM CST -----  Type: Needs Medical Advice    Who Called:  Jackie  Symptoms (please be specific):  Burning with urination, dark urine and has an odor  How long has patient had these symptoms:  2 days  Pharmacy name and phone #:    [facility] - [place] - [address]  [address]  [place] [address]  Phone: [phone] Fax: [phone]  Best Call Back Number: [phone]  Additional Information: Will you order lab testing or does she need to be seen.  Thank you!

## 2019-12-23 NOTE — LETTER
AUTHORIZATION FOR RELEASE OF   CONFIDENTIAL INFORMATION    Dear Release of Information,    We are seeing Jackie Vanessa, date of birth 1955, in the clinic at [facility]. Drew Grant MD is the patient's PCP. Jackie Vanessa has an outstanding lab/procedure at the time we reviewed her chart. In order to help keep her health information updated, she has authorized us to request the following medical record(s):                                             ( X )  COLONOSCOPY  DOS: 2018            Please fax records to [facility], Zachary C Pray, MD, [phone]     If you have any questions, please contact [facility] Clinical Care Coordinator at [phone].           Patient Name: Jackie Vanessa  : 1955  Patient Phone #: [phone]

## 2019-12-23 NOTE — TELEPHONE ENCOUNTER
----- Message from Sneha Ortiz LPN sent at 12/21/2019  3:52 PM CST -----  Regarding: cscope request  Please get cscope report  Overview  Colon polyp at [facility] 2018 repeat 5 years.

## 2019-12-24 NOTE — TELEPHONE ENCOUNTER
Received fax from [facility], no record of patient having colonoscopy done at their facility

## 2019-12-31 ENCOUNTER — TELEPHONE (OUTPATIENT)
Dept: PHARMACY | Facility: CLINIC | Age: 64
End: 2019-12-31

## 2020-01-15 RX ORDER — LEVOTHYROXINE SODIUM 125 UG/1
TABLET ORAL
Qty: 90 TABLET | Refills: 1 | OUTPATIENT
Start: 2020-01-15

## 2020-01-15 NOTE — TELEPHONE ENCOUNTER
Spoke with pt, states she does not need a refill on her thyroid medication, scheduled follow up with Dr Rudy valverde prior, also scheduled her Mammogram, pt aware of all yo date times and location

## 2020-01-21 ENCOUNTER — TELEPHONE (OUTPATIENT)
Dept: FAMILY MEDICINE | Facility: CLINIC | Age: 65
End: 2020-01-21

## 2020-01-22 ENCOUNTER — LAB VISIT (OUTPATIENT)
Dept: LAB | Facility: HOSPITAL | Age: 65
End: 2020-01-22
Attending: FAMILY MEDICINE
Payer: MEDICARE

## 2020-01-22 DIAGNOSIS — R42 DIZZINESS: ICD-10-CM

## 2020-01-22 DIAGNOSIS — Z11.59 ENCOUNTER FOR SCREENING FOR OTHER VIRAL DISEASES: ICD-10-CM

## 2020-01-22 DIAGNOSIS — E78.01 FAMILIAL HYPERCHOLESTEROLEMIA: ICD-10-CM

## 2020-01-22 DIAGNOSIS — E03.9 HYPOTHYROIDISM, ACQUIRED: ICD-10-CM

## 2020-01-22 PROCEDURE — 86803 HEPATITIS C AB TEST: CPT | Mod: HCNC

## 2020-01-22 PROCEDURE — 36415 COLL VENOUS BLD VENIPUNCTURE: CPT | Mod: HCNC,PN

## 2020-01-22 PROCEDURE — 82565 ASSAY OF CREATININE: CPT | Mod: HCNC

## 2020-01-22 PROCEDURE — 80061 LIPID PANEL: CPT | Mod: HCNC

## 2020-01-22 PROCEDURE — 84443 ASSAY THYROID STIM HORMONE: CPT | Mod: HCNC

## 2020-01-23 LAB
CHOLEST SERPL-MCNC: 156 MG/DL (ref 120–199)
CHOLEST/HDLC SERPL: 2 {RATIO} (ref 2–5)
CREAT SERPL-MCNC: 0.8 MG/DL (ref 0.5–1.4)
EST. GFR  (AFRICAN AMERICAN): >60 ML/MIN/1.73 M^2
EST. GFR  (NON AFRICAN AMERICAN): >60 ML/MIN/1.73 M^2
HCV AB SERPL QL IA: NEGATIVE
HDLC SERPL-MCNC: 80 MG/DL (ref 40–75)
HDLC SERPL: 51.3 % (ref 20–50)
LDLC SERPL CALC-MCNC: 57.4 MG/DL (ref 63–159)
NONHDLC SERPL-MCNC: 76 MG/DL
TRIGL SERPL-MCNC: 93 MG/DL (ref 30–150)
TSH SERPL DL<=0.005 MIU/L-ACNC: 1.34 UIU/ML (ref 0.4–4)

## 2020-01-27 ENCOUNTER — DOCUMENTATION ONLY (OUTPATIENT)
Dept: REHABILITATION | Facility: HOSPITAL | Age: 65
End: 2020-01-27

## 2020-01-27 DIAGNOSIS — R26.81 GAIT INSTABILITY: Primary | ICD-10-CM

## 2020-01-27 DIAGNOSIS — M62.81 MUSCLE WEAKNESS: ICD-10-CM

## 2020-01-27 NOTE — PROGRESS NOTES
PHYSICAL THERAPY DISCHARGE SUMMARY    Name: Jackie Vanessa  Referring Provider: Marley Johnson NP  PT Order: PT evaluate and treat No inner ear problems. Balance Retraining therapy  Clinical #: [id number]  Discharge Summary Date: 1/27/2020  Diagnosis:   1. Gait instability     2. Muscle weakness         Patient was seen for 8 OP PT visits from 10-24-19 to 11-21-19. Pt cancelled/no show visit 5 scheduled sessions. Treatment included: evaluation, HEP, pt education, ther ex, and neuromuscular re-education. PT unable to fully assess goal achievement as pt did not return for follow up sessions/did not reschedule follow up visits. This patient is discharged from OP PT Services.

## 2020-01-29 ENCOUNTER — TELEPHONE (OUTPATIENT)
Dept: PHARMACY | Facility: CLINIC | Age: 65
End: 2020-01-29

## 2020-02-04 NOTE — TELEPHONE ENCOUNTER
Rx call for Repatha refill pt reached shipping  with  arrival copay 8.95 CCOF: 1719 with pt consent. No supplies is needed at this time, next inj is  pt will inj medication as soon as she receives it. Address and  confirmed. Patient has 0 doses on hand at this time. Patient has not started any new medications, has had no missed doses and no side effects present. Patient is currently taking the medication as directed by doctors instruction Inject 1 mL (140 mg total) into the skin every 14 (fourteen) days. Patient does have a safe place in their residence to keep medication at desired temperature away from small children and pets. Patient also does have the capability of contacting 911 in the event of an emergency. Patient states they do not have any questions or concerns at this time.

## 2020-02-28 ENCOUNTER — TELEPHONE (OUTPATIENT)
Dept: PHARMACY | Facility: CLINIC | Age: 65
End: 2020-02-28

## 2020-02-28 NOTE — TELEPHONE ENCOUNTER
"Patient appears to be a whole month off on her Repatha injections. She set it in the fridge and put a ham in front of it and "out of sight, out of mind". She had a stroke several years ago and says sometimes things just don't register. She took a dose of Repatha yesterday, 2/27/20, so her next dose is due on 3/12/20. She will set a reminder on her Android to repeat every 2 weeks from yesterday with sound reminders 1. 30-60 minutes prior to take dose out of fridge and 2. an hour or so later to remind her to inject. [facility] will call back in 3 weeks to arrange her next refill. She is to call [facility] if further questions or concerns. MDO informed of missed doses. TTN  "

## 2020-03-18 ENCOUNTER — TELEPHONE (OUTPATIENT)
Dept: PHARMACY | Facility: CLINIC | Age: 65
End: 2020-03-18

## 2020-03-18 NOTE — TELEPHONE ENCOUNTER
Rx call for Repatha refill pt reached shipping 3/25 with 3/26 arrival, copay 8.95 CCOF: 8500 with pt consent @004. No supplies is needed at this time, next inj is 3/27. Address and  confirmed. Patient has 0 doses on hand at this time. Patient has not started any new medications, has had no missed doses and no side effects present. Patient is currently taking the medication as directed by doctors instruction Inject 1 mL (140 mg total) into the skin every 14 (fourteen) days. Patient does have a safe place in their residence to keep medication at desired temperature away from small children and pets. Patient also does have the capability of contacting 911 in the event of an emergency. Patient states they do not have any questions or concerns at this time.

## 2020-03-27 ENCOUNTER — PATIENT MESSAGE (OUTPATIENT)
Dept: FAMILY MEDICINE | Facility: CLINIC | Age: 65
End: 2020-03-27

## 2020-03-27 RX ORDER — LEVOTHYROXINE SODIUM 125 UG/1
125 CAPSULE ORAL DAILY
Qty: 90 CAPSULE | Refills: 1 | Status: SHIPPED | OUTPATIENT
Start: 2020-03-27 | End: 2021-09-21 | Stop reason: SDUPTHER

## 2020-03-30 RX ORDER — LEVOTHYROXINE SODIUM 125 UG/1
125 CAPSULE ORAL DAILY
Qty: 90 CAPSULE | Refills: 1 | Status: CANCELLED | OUTPATIENT
Start: 2020-03-30

## 2020-04-01 ENCOUNTER — PATIENT OUTREACH (OUTPATIENT)
Dept: ADMINISTRATIVE | Facility: HOSPITAL | Age: 65
End: 2020-04-01

## 2020-04-02 ENCOUNTER — PATIENT OUTREACH (OUTPATIENT)
Dept: ADMINISTRATIVE | Facility: HOSPITAL | Age: 65
End: 2020-04-02

## 2020-04-02 NOTE — TELEPHONE ENCOUNTER
DOCUMENTATION ONLY:  Prior authorization for Repatha approved from 04/2/2020 to 12/31/2020.    Case ID# [id number]    Co-pay: $8.95    Patient Assistance IS NOT required.    Forward to clinical pharmacist for consult & shipment.-HBR

## 2020-04-03 ENCOUNTER — PATIENT MESSAGE (OUTPATIENT)
Dept: FAMILY MEDICINE | Facility: CLINIC | Age: 65
End: 2020-04-03

## 2020-04-06 RX ORDER — ESCITALOPRAM OXALATE 10 MG/1
10 TABLET ORAL DAILY
Qty: 30 TABLET | Refills: 11 | Status: SHIPPED | OUTPATIENT
Start: 2020-04-06 | End: 2020-04-09

## 2020-04-09 ENCOUNTER — TELEPHONE (OUTPATIENT)
Dept: FAMILY MEDICINE | Facility: CLINIC | Age: 65
End: 2020-04-09

## 2020-04-09 ENCOUNTER — OFFICE VISIT (OUTPATIENT)
Dept: FAMILY MEDICINE | Facility: CLINIC | Age: 65
End: 2020-04-09
Payer: MEDICARE

## 2020-04-09 ENCOUNTER — PATIENT MESSAGE (OUTPATIENT)
Dept: REHABILITATION | Facility: HOSPITAL | Age: 65
End: 2020-04-09

## 2020-04-09 DIAGNOSIS — L21.9 SEBORRHEIC DERMATITIS: Primary | ICD-10-CM

## 2020-04-09 DIAGNOSIS — F43.22 ADJUSTMENT DISORDER WITH ANXIETY: ICD-10-CM

## 2020-04-09 DIAGNOSIS — E03.9 HYPOTHYROIDISM, UNSPECIFIED TYPE: ICD-10-CM

## 2020-04-09 PROCEDURE — 99441 PR PHYSICIAN TELEPHONE EVALUATION 5-10 MIN: CPT | Mod: 95,,, | Performed by: FAMILY MEDICINE

## 2020-04-09 PROCEDURE — 99441 PR PHYSICIAN TELEPHONE EVALUATION 5-10 MIN: ICD-10-PCS | Mod: 95,,, | Performed by: FAMILY MEDICINE

## 2020-04-09 RX ORDER — MUPIROCIN 20 MG/G
OINTMENT TOPICAL 3 TIMES DAILY
Qty: 15 G | Refills: 1 | Status: SHIPPED | OUTPATIENT
Start: 2020-04-09 | End: 2020-09-14 | Stop reason: SDUPTHER

## 2020-04-09 RX ORDER — KETOCONAZOLE 20 MG/ML
SHAMPOO, SUSPENSION TOPICAL
Qty: 120 ML | Refills: 3 | Status: SHIPPED | OUTPATIENT
Start: 2020-04-09 | End: 2020-06-01 | Stop reason: SDUPTHER

## 2020-04-09 RX ORDER — CLONAZEPAM 0.25 MG/1
0.25 TABLET, ORALLY DISINTEGRATING ORAL 2 TIMES DAILY PRN
Qty: 15 TABLET | Refills: 1 | Status: SHIPPED | OUTPATIENT
Start: 2020-04-09 | End: 2020-05-22 | Stop reason: SDUPTHER

## 2020-04-09 RX ORDER — ESCITALOPRAM OXALATE 20 MG/1
20 TABLET ORAL DAILY
Qty: 30 TABLET | Refills: 11 | Status: SHIPPED | OUTPATIENT
Start: 2020-04-09 | End: 2020-06-01 | Stop reason: SDUPTHER

## 2020-04-09 NOTE — TELEPHONE ENCOUNTER
Spoke with pt and assisted with setting up my chart yo for virtual visit. Pt expressed to me that she fell on her forehead several years ago and has had an on and off again infection which is helped with Bactroban. Pt also  Experiences on and off again tremors and studdering due to thyroid condition. Pt finally calmed down after talking with her for awhile on the phone.

## 2020-04-09 NOTE — PROGRESS NOTES
THIS DOCUMENT WAS MADE IN PART WITH VOICE RECOGNITION SOFTWARE.  OCCASIONALLY THIS SOFTWARE WILL MISINTERPRET WORDS OR PHRASES.    Assessment and Plan:    1. Seborrheic dermatitis  - ketoconazole (NIZORAL) 2 % shampoo; Apply topically twice a week.  Dispense: 120 mL; Refill: 3  - mupirocin (BACTROBAN) 2 % ointment; Apply topically 3 (three) times daily.  Dispense: 15 g; Refill: 1    2. Adjustment disorder with anxiety    - clonazePAM (KLONOPIN) 0.25 MG TbDL; Take 1 tablet (0.25 mg total) by mouth 2 (two) times daily as needed.  Dispense: 15 tablet; Refill: 1  - escitalopram oxalate (LEXAPRO) 20 MG tablet; Take 1 tablet (20 mg total) by mouth once daily.  Dispense: 30 tablet; Refill: 11    3. Hypothyroidism, unspecified type  Restarted medication  If symptoms persist will consider labwork to eval        ______________________________________________________________________  Subjective:    Chief Complaint:  Respiratory infection     HPI:  Jackie is a 64 y.o. year old     The patient location is: home  The chief complaint leading to consultation is: resp infection  Visit type: Telephone  Total time spent with patient: 10 minutes  Each patient to whom he or she provides medical services by telemedicine is:  (1) informed of the relationship between the physician and patient and the respective role of any other health care provider with respect to management of the patient; and (2) notified that he or she may decline to receive medical services by telemedicine and may withdraw from such care at any time.    Notes:     Scalp itching  History of what sounds like seb derm of scalp   Had similar lesion treated with bactroban and ketoconazole shampoo     Anxiety  High levels of anxiety  Physically shaking and stuttering   Compliant with lexapro  Pt thinks the week off synthroid contributed to her symptoms  Lost prescription thyroid medication; was off medication x 1 week; restarted medication recently and symptoms improved.    Has been participating in therapy, last session a few weeks ago.         Past Medical History:  Past Medical History:   Diagnosis Date    Abnormal colonoscopy 3/21/2019    Colon polyp at [facility] 2018 repeat 5 years.     Graves' disease 3/21/2019    Hyperlipidemia, mixed 3/21/2019    Hypothyroidism (acquired) 3/21/2019    Lyme disease        Past Surgical History:  No past surgical history on file.    Family History:  No family history on file.    Social History:  Social History     Socioeconomic History    Marital status:      Spouse name: Not on file    Number of children: Not on file    Years of education: Not on file    Highest education level: Not on file   Occupational History    Not on file   Social Needs    Financial resource strain: Not on file    Food insecurity:     Worry: Not on file     Inability: Not on file    Transportation needs:     Medical: Not on file     Non-medical: Not on file   Tobacco Use    Smoking status: Current Every Day Smoker     Packs/day: 0.50     Years: 48.00     Pack years: 24.00     Types: Cigarettes    Smokeless tobacco: Never Used   Substance and Sexual Activity    Alcohol use: Not on file    Drug use: Not on file    Sexual activity: Not on file   Lifestyle    Physical activity:     Days per week: Not on file     Minutes per session: Not on file    Stress: Not on file   Relationships    Social connections:     Talks on phone: Not on file     Gets together: Not on file     Attends Religion service: Not on file     Active member of club or organization: Not on file     Attends meetings of clubs or organizations: Not on file     Relationship status: Not on file   Other Topics Concern    Not on file   Social History Narrative    Not on file       Medications:  Current Outpatient Medications on File Prior to Visit   Medication Sig Dispense Refill    cetirizine (ZYRTEC) 10 MG tablet Take 10 mg by mouth once daily.      escitalopram oxalate (LEXAPRO) 10 MG  tablet Take 1 tablet (10 mg total) by mouth once daily. 30 tablet 11    evolocumab (REPATHA SURECLICK) 140 mg/mL PnIj Inject 1 mL (140 mg total) into the skin every 14 (fourteen) days. 2 mL 12    fluconazole (DIFLUCAN) 150 MG Tab Take 1 tablet (150 mg total) by mouth once daily. 2 tablet 0    hydrOXYzine HCl (ATARAX) 25 MG tablet TAKE 1 TABLET THREE TIMES DAILY AS NEEDED FOR  ITCHING 90 tablet 0    levothyroxine 125 mcg Cap Take 125 mcg by mouth once daily. 90 capsule 1    rosuvastatin (CRESTOR) 40 MG Tab Take 1 tablet (40 mg total) by mouth every evening. 90 tablet 4     No current facility-administered medications on file prior to visit.        Allergies:  Patient has no known allergies.    Immunizations:  Immunization History   Administered Date(s) Administered    Pneumococcal Polysaccharide - 23 Valent 10/10/2019       Review of Systems:  Review of Systems   All other systems reviewed and are negative.        Data:  No previous labs, imaging, or notes available.        Drew Grant MD  Family Medicine

## 2020-04-14 ENCOUNTER — PATIENT OUTREACH (OUTPATIENT)
Dept: ADMINISTRATIVE | Facility: HOSPITAL | Age: 65
End: 2020-04-14

## 2020-04-15 ENCOUNTER — OFFICE VISIT (OUTPATIENT)
Dept: FAMILY MEDICINE | Facility: CLINIC | Age: 65
End: 2020-04-15
Payer: MEDICARE

## 2020-04-15 VITALS — BODY MASS INDEX: 20.97 KG/M2 | WEIGHT: 126 LBS | RESPIRATION RATE: 16 BRPM

## 2020-04-15 DIAGNOSIS — F43.22 ADJUSTMENT DISORDER WITH ANXIETY: Primary | ICD-10-CM

## 2020-04-15 PROCEDURE — 99213 PR OFFICE/OUTPT VISIT, EST, LEVL III, 20-29 MIN: ICD-10-PCS | Mod: HCNC,95,, | Performed by: FAMILY MEDICINE

## 2020-04-15 PROCEDURE — 3008F BODY MASS INDEX DOCD: CPT | Mod: HCNC,CPTII,95, | Performed by: FAMILY MEDICINE

## 2020-04-15 PROCEDURE — 3008F PR BODY MASS INDEX (BMI) DOCUMENTED: ICD-10-PCS | Mod: HCNC,CPTII,95, | Performed by: FAMILY MEDICINE

## 2020-04-15 PROCEDURE — 99213 OFFICE O/P EST LOW 20 MIN: CPT | Mod: HCNC,95,, | Performed by: FAMILY MEDICINE

## 2020-04-15 NOTE — PROGRESS NOTES
THIS DOCUMENT WAS MADE IN PART WITH VOICE RECOGNITION SOFTWARE.  OCCASIONALLY THIS SOFTWARE WILL MISINTERPRET WORDS OR PHRASES.    Assessment and Plan:    1. Adjustment disorder with anxiety  Continue current medications, condition improved    ______________________________________________________________________  Subjective:    Chief Complaint:  F/u anxiety    HPI:  Jackie is a 64 y.o. year old     The patient location is: [facility]  The chief complaint leading to consultation is: follow up  Visit type: audiovisual  Total time spent with patient: 15 minutes  Each patient to whom he or she provides medical services by telemedicine is:  (1) informed of the relationship between the physician and patient and the respective role of any other health care provider with respect to management of the patient; and (2) notified that he or she may decline to receive medical services by telemedicine and may withdraw from such care at any time.    Notes:     Anxiety  4/15/2020 : High levels of anxiety, Physically shaking and stuttering, Compliant with lexapro. We increased lexapro dose and added benzo for PRN use.     Today : reports that symptoms have improved. Only taking about 1/2 Clonazepam daily. Compliant with Lexapro 20mg. Less stuttering and shaking. No adverse effect of medication.     Past Medical History:  Past Medical History:   Diagnosis Date    Abnormal colonoscopy 3/21/2019    Colon polyp at  2018 repeat 5 years.     Graves' disease 3/21/2019    Hyperlipidemia, mixed 3/21/2019    Hypothyroidism (acquired) 3/21/2019    Lyme disease        Past Surgical History:  No past surgical history on file.    Family History:  No family history on file.    Social History:  Social History     Socioeconomic History    Marital status:      Spouse name: Not on file    Number of children: Not on file    Years of education: Not on file    Highest education level: Not on file   Occupational History    Not on file    Social Needs    Financial resource strain: Not on file    Food insecurity:     Worry: Not on file     Inability: Not on file    Transportation needs:     Medical: Not on file     Non-medical: Not on file   Tobacco Use    Smoking status: Current Every Day Smoker     Packs/day: 0.50     Years: 48.00     Pack years: 24.00     Types: Cigarettes    Smokeless tobacco: Never Used   Substance and Sexual Activity    Alcohol use: Not on file    Drug use: Not on file    Sexual activity: Not on file   Lifestyle    Physical activity:     Days per week: Not on file     Minutes per session: Not on file    Stress: Not on file   Relationships    Social connections:     Talks on phone: Not on file     Gets together: Not on file     Attends [employer]ism service: Not on file     Active member of club or organization: Not on file     Attends meetings of clubs or organizations: Not on file     Relationship status: Not on file   Other Topics Concern    Not on file   Social History Narrative    Not on file       Medications:  Current Outpatient Medications on File Prior to Visit   Medication Sig Dispense Refill    cetirizine (ZYRTEC) 10 MG tablet Take 10 mg by mouth once daily.      clonazePAM (KLONOPIN) 0.25 MG TbDL Take 1 tablet (0.25 mg total) by mouth 2 (two) times daily as needed. 15 tablet 1    escitalopram oxalate (LEXAPRO) 20 MG tablet Take 1 tablet (20 mg total) by mouth once daily. 30 tablet 11    evolocumab (REPATHA SURECLICK) 140 mg/mL PnIj Inject 1 mL (140 mg total) into the skin every 14 (fourteen) days. 2 mL 12    hydrOXYzine HCl (ATARAX) 25 MG tablet TAKE 1 TABLET THREE TIMES DAILY AS NEEDED FOR  ITCHING 90 tablet 0    ketoconazole (NIZORAL) 2 % shampoo Apply topically twice a week. 120 mL 3    levothyroxine 125 mcg Cap Take 125 mcg by mouth once daily. 90 capsule 1    mupirocin (BACTROBAN) 2 % ointment Apply topically 3 (three) times daily. 15 g 1    rosuvastatin (CRESTOR) 40 MG Tab Take 1 tablet (40  mg total) by mouth every evening. 90 tablet 4     No current facility-administered medications on file prior to visit.        Allergies:  Patient has no known allergies.    Immunizations:  Immunization History   Administered Date(s) Administered    Pneumococcal Polysaccharide - 23 Valent 10/10/2019       Review of Systems:  Review of Systems   All other systems reviewed and are negative.      Objective:    Vitals:  Vitals:    04/15/20 1334   Resp: 16   Weight: 57.2 kg (126 lb)   PainSc: 0-No pain       Physical Exam   Constitutional: She appears well-developed. No distress.   HENT:   Head: Normocephalic and atraumatic.   Eyes: EOM are normal.   Neck: Normal range of motion.   Pulmonary/Chest: Effort normal. No respiratory distress.   Psychiatric: She has a normal mood and affect. Her behavior is normal. Judgment and thought content normal.   Vitals reviewed.      Data:  No previous labs, imaging, or notes available.        Drew Grant MD  Family Medicine

## 2020-04-24 ENCOUNTER — TELEPHONE (OUTPATIENT)
Dept: PHARMACY | Facility: CLINIC | Age: 65
End: 2020-04-24

## 2020-04-30 ENCOUNTER — TELEPHONE (OUTPATIENT)
Dept: PHARMACY | Facility: CLINIC | Age: 65
End: 2020-04-30

## 2020-05-22 ENCOUNTER — PATIENT MESSAGE (OUTPATIENT)
Dept: FAMILY MEDICINE | Facility: CLINIC | Age: 65
End: 2020-05-22

## 2020-05-22 DIAGNOSIS — F43.22 ADJUSTMENT DISORDER WITH ANXIETY: ICD-10-CM

## 2020-05-22 RX ORDER — CLONAZEPAM 0.25 MG/1
0.25 TABLET, ORALLY DISINTEGRATING ORAL 2 TIMES DAILY PRN
Qty: 15 TABLET | Refills: 1 | Status: SHIPPED | OUTPATIENT
Start: 2020-05-22 | End: 2020-06-01 | Stop reason: SDUPTHER

## 2020-05-22 NOTE — TELEPHONE ENCOUNTER
Please review and approve.  Last OV 4/15/20  Last refill date 4/9/20, #15 x 1R BID PRN  Pt changing pharmacies.

## 2020-06-01 DIAGNOSIS — L21.9 SEBORRHEIC DERMATITIS: ICD-10-CM

## 2020-06-01 DIAGNOSIS — F43.22 ADJUSTMENT DISORDER WITH ANXIETY: ICD-10-CM

## 2020-06-01 RX ORDER — CLONAZEPAM 0.25 MG/1
0.25 TABLET, ORALLY DISINTEGRATING ORAL 2 TIMES DAILY PRN
Qty: 15 TABLET | Refills: 1 | Status: SHIPPED | OUTPATIENT
Start: 2020-06-01 | End: 2020-09-14 | Stop reason: SDUPTHER

## 2020-06-01 RX ORDER — KETOCONAZOLE 20 MG/ML
SHAMPOO, SUSPENSION TOPICAL
Qty: 120 ML | Refills: 3 | Status: SHIPPED | OUTPATIENT
Start: 2020-06-01 | End: 2020-09-14 | Stop reason: SDUPTHER

## 2020-06-01 RX ORDER — ESCITALOPRAM OXALATE 20 MG/1
20 TABLET ORAL DAILY
Qty: 30 TABLET | Refills: 11 | Status: SHIPPED | OUTPATIENT
Start: 2020-06-01 | End: 2021-01-08 | Stop reason: SDUPTHER

## 2020-06-02 ENCOUNTER — TELEPHONE (OUTPATIENT)
Dept: PHARMACY | Facility: CLINIC | Age: 65
End: 2020-06-02

## 2020-06-08 ENCOUNTER — TELEPHONE (OUTPATIENT)
Dept: PHARMACY | Facility: CLINIC | Age: 65
End: 2020-06-08

## 2020-06-15 ENCOUNTER — TELEPHONE (OUTPATIENT)
Dept: PHARMACY | Facility: CLINIC | Age: 65
End: 2020-06-15

## 2020-06-22 NOTE — TELEPHONE ENCOUNTER
Patient called for refill readiness on Repatha. No answer - lvm for call back. Sent [facility].    Lady Santa, Pharm.D.  Pharmacy Resident, PGY-1   [facility]  [phone]

## 2020-06-26 ENCOUNTER — TELEPHONE (OUTPATIENT)
Dept: PHARMACY | Facility: CLINIC | Age: 65
End: 2020-06-26

## 2020-07-02 ENCOUNTER — TELEPHONE (OUTPATIENT)
Dept: PHARMACY | Facility: CLINIC | Age: 65
End: 2020-07-02

## 2020-07-02 NOTE — TELEPHONE ENCOUNTER
Repatha refill confirmed with patient. We will ship Praluent refill on  via [employer] to arrive on . $8.95 copay- 004. Confirmed 2 patient identifiers - name and . Therapy appropriate.     Patient has 0 doses of Praluent remaining and takes next injection next Friday, 7/3. She states she was out of town and had extra pen on hand from previous shipment she used for last injection  Pt reports they are not having any side effects so far. No missed doses, no new medications, no new allergies or health conditions reported at this time. All questions answered and addressed to patients satisfaction. Advised to call [facility] and provider if any issues arise.  Pt verbalized understanding.

## 2020-07-17 ENCOUNTER — PATIENT MESSAGE (OUTPATIENT)
Dept: FAMILY MEDICINE | Facility: CLINIC | Age: 65
End: 2020-07-17

## 2020-07-17 DIAGNOSIS — R05.9 COUGH: ICD-10-CM

## 2020-07-17 DIAGNOSIS — Z20.822 SUSPECTED COVID-19 VIRUS INFECTION: Primary | ICD-10-CM

## 2020-07-17 NOTE — TELEPHONE ENCOUNTER
Spoke with pt and let her know COVID test and pt verbalized understanding.   Pt states she does not want to schedule at this time.

## 2020-07-22 ENCOUNTER — CLINICAL SUPPORT (OUTPATIENT)
Dept: URGENT CARE | Facility: CLINIC | Age: 65
End: 2020-07-22
Payer: MEDICARE

## 2020-07-22 DIAGNOSIS — Z20.822 SUSPECTED COVID-19 VIRUS INFECTION: ICD-10-CM

## 2020-07-22 DIAGNOSIS — R05.9 COUGH: ICD-10-CM

## 2020-07-22 PROCEDURE — U0003 INFECTIOUS AGENT DETECTION BY NUCLEIC ACID (DNA OR RNA); SEVERE ACUTE RESPIRATORY SYNDROME CORONAVIRUS 2 (SARS-COV-2) (CORONAVIRUS DISEASE [COVID-19]), AMPLIFIED PROBE TECHNIQUE, MAKING USE OF HIGH THROUGHPUT TECHNOLOGIES AS DESCRIBED BY CMS-2020-01-R: HCPCS | Mod: HCNC

## 2020-07-22 PROCEDURE — 99211 PR OFFICE/OUTPT VISIT, EST, LEVL I: ICD-10-PCS | Mod: S$GLB,,, | Performed by: PHYSICIAN ASSISTANT

## 2020-07-22 PROCEDURE — 99211 OFF/OP EST MAY X REQ PHY/QHP: CPT | Mod: S$GLB,,, | Performed by: PHYSICIAN ASSISTANT

## 2020-07-24 LAB — SARS-COV-2 RNA RESP QL NAA+PROBE: NOT DETECTED

## 2020-07-31 ENCOUNTER — TELEPHONE (OUTPATIENT)
Dept: PHARMACY | Facility: CLINIC | Age: 65
End: 2020-07-31

## 2020-09-04 ENCOUNTER — TELEPHONE (OUTPATIENT)
Dept: PHARMACY | Facility: CLINIC | Age: 65
End: 2020-09-04

## 2020-09-14 ENCOUNTER — TELEPHONE (OUTPATIENT)
Dept: PHARMACY | Facility: CLINIC | Age: 65
End: 2020-09-14

## 2020-09-17 ENCOUNTER — PATIENT MESSAGE (OUTPATIENT)
Dept: ADMINISTRATIVE | Facility: HOSPITAL | Age: 65
End: 2020-09-17

## 2020-10-05 ENCOUNTER — PATIENT MESSAGE (OUTPATIENT)
Dept: ADMINISTRATIVE | Facility: HOSPITAL | Age: 65
End: 2020-10-05

## 2020-10-22 DIAGNOSIS — Z12.11 COLON CANCER SCREENING: ICD-10-CM

## 2020-11-27 ENCOUNTER — SPECIALTY PHARMACY (OUTPATIENT)
Dept: PHARMACY | Facility: CLINIC | Age: 65
End: 2020-11-27

## 2020-11-27 NOTE — TELEPHONE ENCOUNTER
Specialty Pharmacy - Refill Coordination    Patient stated she was due last week, but stated she did not have sufficient funds to complete refill at that time. Advised pt to inject on Tues 12/1 upon arrival and conitnue with every other Tuesday going forward. She voiced understanding.  Pt had no further questions or concerns    Specialty Medication Orders Linked to Encounter      Most Recent Value   Medication #1  evolocumab (REPATHA SURECLICK) 140 mg/mL PnIj (Order#[id number], Rx#[id number])          Refill Questions - Documented Responses      Most Recent Value   Relationship to patient of person spoken to?  Self   HIPAA/medical authority confirmed?  Yes   Any changes in contact preferences or allowed representatives?  No   Has the patient had any insurance changes?  No   Does the patient have any concerns or questions about taking or administering this medication as prescribed?  No   How many doses did the patient miss in the past 4 weeks or since the last fill?  1 [Patient was due last week but stated she did not have sufficient funds to complete refill at that time. Advised pt to inject on 12/1 upon arrival and conitnue with every other Tuesday going forward. She voiced understanding.]   How will the patient receive the medication?  Mail   When does the patient need to receive the medication?  12/01/20   Expected Copay ($)  8.95   Is the patient able to afford the medication copay?  Yes   Payment Method  CC on file   Days supply of Refill  28   Would patient like to speak to a pharmacist?  Yes   Do you want to trigger an intervention?  No   Do you want to trigger an additional referral task?  No   Refill activity completed?  Yes   Refill activity plan  Refill scheduled   Shipment/Pickup Date:  11/30/20          Current Outpatient Medications   Medication Sig    cetirizine (ZYRTEC) 10 MG tablet Take 10 mg by mouth once daily.    clonazePAM (KLONOPIN) 0.25 MG TbDL Take 1 tablet (0.25 mg total) by mouth 2 (two)  times daily as needed.    escitalopram oxalate (LEXAPRO) 20 MG tablet Take 1 tablet (20 mg total) by mouth once daily.    evolocumab (REPATHA SURECLICK) 140 mg/mL PnIj Inject 1 mL (140 mg total) into the skin every 14 (fourteen) days.    hydrOXYzine HCL (ATARAX) 25 MG tablet Take 1 tablet (25 mg total) by mouth 3 (three) times daily as needed for Itching.    ketoconazole (NIZORAL) 2 % shampoo Apply topically twice a week.    levothyroxine (SYNTHROID) 125 MCG tablet Take 1 tablet by mouth once daily    levothyroxine 125 mcg Cap Take 125 mcg by mouth once daily.    mupirocin (BACTROBAN) 2 % ointment Apply topically 3 (three) times daily.    rosuvastatin (CRESTOR) 40 MG Tab Take 1 tablet (40 mg total) by mouth every evening.   Last reviewed on 4/15/2020  1:35 PM by Drew Grant MD    Review of patient's allergies indicates:  No Known Allergies Last reviewed on  9/14/2020 6:14 PM by Aileen Keita      Tasks added this encounter   11/27/2020 - Refill Call   Tasks due within next 3 months   No tasks due.     Erendira De La Rosa, PharmD  [facility]  [address] [place] [id number]  Phone: [phone]  Fax: [phone]

## 2020-12-07 RX ORDER — EVOLOCUMAB 140 MG/ML
140 INJECTION, SOLUTION SUBCUTANEOUS
Qty: 2 ML | Refills: 12 | Status: SHIPPED | OUTPATIENT
Start: 2020-12-07 | End: 2021-01-04

## 2020-12-23 ENCOUNTER — PATIENT MESSAGE (OUTPATIENT)
Dept: PHARMACY | Facility: CLINIC | Age: 65
End: 2020-12-23

## 2020-12-28 ENCOUNTER — SPECIALTY PHARMACY (OUTPATIENT)
Dept: PHARMACY | Facility: CLINIC | Age: 65
End: 2020-12-28

## 2020-12-28 NOTE — TELEPHONE ENCOUNTER
Specialty Pharmacy - Refill Coordination    Specialty Medication Orders Linked to Encounter      Most Recent Value   Medication #1  evolocumab (REPATHA SURECLICK) 140 mg/mL PnIj (Order#[id number], Rx#[id number])          Refill Questions - Documented Responses      Most Recent Value   Relationship to patient of person spoken to?  Self   HIPAA/medical authority confirmed?  Yes   How will the patient receive the medication?  Mail   When does the patient need to receive the medication?  12/31/20   Shipping Address  Home   Address in [facility] confirmed and updated if neccessary?  Yes   Expected Copay ($)  8.95   Is the patient able to afford the medication copay?  Yes   Payment Method  CC on file   Days supply of Refill  28   Would patient like to speak to a pharmacist?  No   Do you want to trigger an intervention?  No   Do you want to trigger an additional referral task?  No   Refill activity completed?  Yes   Refill activity plan  Refill scheduled   Shipment/Pickup Date:  12/29/20          Current Outpatient Medications   Medication Sig    cetirizine (ZYRTEC) 10 MG tablet Take 10 mg by mouth once daily.    clonazePAM (KLONOPIN) 0.25 MG TbDL Take 1 tablet (0.25 mg total) by mouth 2 (two) times daily as needed.    escitalopram oxalate (LEXAPRO) 20 MG tablet Take 1 tablet (20 mg total) by mouth once daily.    evolocumab (REPATHA SURECLICK) 140 mg/mL PnIj Inject 1 mL (140 mg total) into the skin every 14 (fourteen) days.    hydrOXYzine HCL (ATARAX) 25 MG tablet Take 1 tablet (25 mg total) by mouth 3 (three) times daily as needed for Itching.    ketoconazole (NIZORAL) 2 % shampoo Apply topically twice a week.    levothyroxine (SYNTHROID) 125 MCG tablet Take 1 tablet by mouth once daily    levothyroxine 125 mcg Cap Take 125 mcg by mouth once daily.    mupirocin (BACTROBAN) 2 % ointment Apply topically 3 (three) times daily.    rosuvastatin (CRESTOR) 40 MG Tab Take 1 tablet (40 mg total) by mouth every  evening.   Last reviewed on 4/15/2020  1:35 PM by Drew Grant MD    Review of patient's allergies indicates:  No Known Allergies Last reviewed on  9/14/2020 6:14 PM by Aileen Keita      Tasks added this encounter   1/20/2021 - Refill Call (Auto Added)   Tasks due within next 3 months   No tasks due.     Ch[facility] MontillaO[facility] HospitalRema  [facility]  [address] [place] [address]  Phone: [phone]  Fax: [phone]

## 2021-01-04 ENCOUNTER — PATIENT MESSAGE (OUTPATIENT)
Dept: ADMINISTRATIVE | Facility: HOSPITAL | Age: 66
End: 2021-01-04

## 2021-01-08 DIAGNOSIS — L21.9 SEBORRHEIC DERMATITIS: ICD-10-CM

## 2021-01-08 DIAGNOSIS — E78.01 FAMILIAL HYPERCHOLESTEROLEMIA: ICD-10-CM

## 2021-01-08 DIAGNOSIS — F43.22 ADJUSTMENT DISORDER WITH ANXIETY: ICD-10-CM

## 2021-01-08 RX ORDER — ROSUVASTATIN CALCIUM 40 MG/1
40 TABLET, COATED ORAL NIGHTLY
Qty: 90 TABLET | Refills: 4 | Status: SHIPPED | OUTPATIENT
Start: 2021-01-08 | End: 2021-09-21 | Stop reason: SDUPTHER

## 2021-01-08 RX ORDER — CLONAZEPAM 0.25 MG/1
0.25 TABLET, ORALLY DISINTEGRATING ORAL 2 TIMES DAILY PRN
Qty: 15 TABLET | Refills: 1 | Status: SHIPPED | OUTPATIENT
Start: 2021-01-08 | End: 2021-09-21 | Stop reason: SDUPTHER

## 2021-01-08 RX ORDER — LEVOTHYROXINE SODIUM 125 UG/1
125 TABLET ORAL DAILY
Qty: 90 TABLET | Refills: 0 | Status: SHIPPED | OUTPATIENT
Start: 2021-01-08 | End: 2021-04-14

## 2021-01-08 RX ORDER — MUPIROCIN 20 MG/G
OINTMENT TOPICAL 3 TIMES DAILY
Qty: 15 G | Refills: 1 | Status: SHIPPED | OUTPATIENT
Start: 2021-01-08 | End: 2023-03-17

## 2021-01-08 RX ORDER — ESCITALOPRAM OXALATE 20 MG/1
20 TABLET ORAL DAILY
Qty: 30 TABLET | Refills: 11 | Status: SHIPPED | OUTPATIENT
Start: 2021-01-08 | End: 2022-01-24 | Stop reason: SDUPTHER

## 2021-01-08 RX ORDER — KETOCONAZOLE 20 MG/ML
SHAMPOO, SUSPENSION TOPICAL
Qty: 120 ML | Refills: 3 | Status: SHIPPED | OUTPATIENT
Start: 2021-01-11 | End: 2023-03-17

## 2021-01-21 ENCOUNTER — SPECIALTY PHARMACY (OUTPATIENT)
Dept: PHARMACY | Facility: CLINIC | Age: 66
End: 2021-01-21

## 2021-02-19 ENCOUNTER — SPECIALTY PHARMACY (OUTPATIENT)
Dept: PHARMACY | Facility: CLINIC | Age: 66
End: 2021-02-19

## 2021-03-15 DIAGNOSIS — Z12.31 ENCOUNTER FOR SCREENING MAMMOGRAM FOR BREAST CANCER: Primary | ICD-10-CM

## 2021-03-18 NOTE — TELEPHONE ENCOUNTER
Pt plans to attend next session on Monday 11-11-19   · UA with innumerable bacteria and WBCs  · U/C, pending   · Continue ceftriaxone #2

## 2021-03-19 ENCOUNTER — SPECIALTY PHARMACY (OUTPATIENT)
Dept: PHARMACY | Facility: CLINIC | Age: 66
End: 2021-03-19

## 2021-03-26 ENCOUNTER — PATIENT MESSAGE (OUTPATIENT)
Dept: FAMILY MEDICINE | Facility: CLINIC | Age: 66
End: 2021-03-26

## 2021-04-06 ENCOUNTER — PATIENT MESSAGE (OUTPATIENT)
Dept: ADMINISTRATIVE | Facility: HOSPITAL | Age: 66
End: 2021-04-06

## 2021-04-16 ENCOUNTER — SPECIALTY PHARMACY (OUTPATIENT)
Dept: PHARMACY | Facility: CLINIC | Age: 66
End: 2021-04-16

## 2021-05-10 ENCOUNTER — SPECIALTY PHARMACY (OUTPATIENT)
Dept: PHARMACY | Facility: CLINIC | Age: 66
End: 2021-05-10

## 2021-05-12 ENCOUNTER — PATIENT MESSAGE (OUTPATIENT)
Dept: RESEARCH | Facility: HOSPITAL | Age: 66
End: 2021-05-12

## 2021-06-02 ENCOUNTER — SPECIALTY PHARMACY (OUTPATIENT)
Dept: PHARMACY | Facility: CLINIC | Age: 66
End: 2021-06-02

## 2021-06-23 ENCOUNTER — PES CALL (OUTPATIENT)
Dept: ADMINISTRATIVE | Facility: CLINIC | Age: 66
End: 2021-06-23

## 2021-06-26 DIAGNOSIS — E03.9 HYPOTHYROIDISM (ACQUIRED): Primary | ICD-10-CM

## 2021-06-26 DIAGNOSIS — F43.22 ADJUSTMENT DISORDER WITH ANXIETY: ICD-10-CM

## 2021-06-26 DIAGNOSIS — Z00.00 ENCOUNTER FOR HEALTH MAINTENANCE EXAMINATION: ICD-10-CM

## 2021-06-26 DIAGNOSIS — E05.00 GRAVES' DISEASE: ICD-10-CM

## 2021-06-26 DIAGNOSIS — E78.01 FAMILIAL HYPERCHOLESTEROLEMIA: ICD-10-CM

## 2021-06-26 RX ORDER — HYDROXYZINE HYDROCHLORIDE 25 MG/1
25 TABLET, FILM COATED ORAL 3 TIMES DAILY PRN
Qty: 90 TABLET | Refills: 0 | Status: CANCELLED | OUTPATIENT
Start: 2021-06-26

## 2021-06-26 RX ORDER — CLONAZEPAM 0.25 MG/1
0.25 TABLET, ORALLY DISINTEGRATING ORAL 2 TIMES DAILY PRN
Qty: 15 TABLET | Refills: 1 | Status: CANCELLED | OUTPATIENT
Start: 2021-06-26 | End: 2022-06-26

## 2021-06-26 RX ORDER — ROSUVASTATIN CALCIUM 40 MG/1
40 TABLET, COATED ORAL NIGHTLY
Qty: 90 TABLET | Refills: 4 | Status: CANCELLED | OUTPATIENT
Start: 2021-06-26 | End: 2022-06-26

## 2021-06-29 RX ORDER — LEVOTHYROXINE SODIUM 125 UG/1
125 TABLET ORAL DAILY
Qty: 30 TABLET | Refills: 0 | Status: SHIPPED | OUTPATIENT
Start: 2021-06-29 | End: 2021-08-27 | Stop reason: SDUPTHER

## 2021-07-02 ENCOUNTER — SPECIALTY PHARMACY (OUTPATIENT)
Dept: PHARMACY | Facility: CLINIC | Age: 66
End: 2021-07-02

## 2021-07-23 ENCOUNTER — SPECIALTY PHARMACY (OUTPATIENT)
Dept: PHARMACY | Facility: CLINIC | Age: 66
End: 2021-07-23

## 2021-08-19 ENCOUNTER — SPECIALTY PHARMACY (OUTPATIENT)
Dept: PHARMACY | Facility: CLINIC | Age: 66
End: 2021-08-19

## 2021-08-19 ENCOUNTER — PATIENT MESSAGE (OUTPATIENT)
Dept: PHARMACY | Facility: CLINIC | Age: 66
End: 2021-08-19

## 2021-08-27 DIAGNOSIS — E03.9 HYPOTHYROIDISM (ACQUIRED): ICD-10-CM

## 2021-08-27 DIAGNOSIS — E05.00 GRAVES' DISEASE: ICD-10-CM

## 2021-08-27 RX ORDER — LEVOTHYROXINE SODIUM 125 UG/1
125 TABLET ORAL DAILY
Qty: 30 TABLET | Refills: 0 | Status: SHIPPED | OUTPATIENT
Start: 2021-08-27 | End: 2021-08-27 | Stop reason: SDUPTHER

## 2021-09-21 ENCOUNTER — OFFICE VISIT (OUTPATIENT)
Dept: FAMILY MEDICINE | Facility: CLINIC | Age: 66
End: 2021-09-21
Payer: MEDICARE

## 2021-09-21 VITALS
OXYGEN SATURATION: 96 % | HEIGHT: 65 IN | WEIGHT: 111.75 LBS | TEMPERATURE: 99 F | BODY MASS INDEX: 18.62 KG/M2 | SYSTOLIC BLOOD PRESSURE: 138 MMHG | DIASTOLIC BLOOD PRESSURE: 76 MMHG | HEART RATE: 77 BPM

## 2021-09-21 DIAGNOSIS — Z23 IMMUNIZATION DUE: ICD-10-CM

## 2021-09-21 DIAGNOSIS — Z12.4 CERVICAL CANCER SCREENING: Primary | ICD-10-CM

## 2021-09-21 DIAGNOSIS — E05.00 GRAVES' DISEASE: ICD-10-CM

## 2021-09-21 DIAGNOSIS — F43.22 ADJUSTMENT DISORDER WITH ANXIETY: ICD-10-CM

## 2021-09-21 DIAGNOSIS — F17.210 NICOTINE DEPENDENCE, CIGARETTES, UNCOMPLICATED: ICD-10-CM

## 2021-09-21 DIAGNOSIS — Z79.899 DRUG THERAPY: ICD-10-CM

## 2021-09-21 DIAGNOSIS — E03.9 HYPOTHYROIDISM (ACQUIRED): ICD-10-CM

## 2021-09-21 DIAGNOSIS — R30.0 DYSURIA: ICD-10-CM

## 2021-09-21 DIAGNOSIS — E78.01 FAMILIAL HYPERCHOLESTEROLEMIA: ICD-10-CM

## 2021-09-21 PROCEDURE — 99214 OFFICE O/P EST MOD 30 MIN: CPT | Mod: HCNC,S$GLB,, | Performed by: FAMILY MEDICINE

## 2021-09-21 PROCEDURE — 3075F PR MOST RECENT SYSTOLIC BLOOD PRESS GE 130-139MM HG: ICD-10-PCS | Mod: HCNC,CPTII,S$GLB, | Performed by: FAMILY MEDICINE

## 2021-09-21 PROCEDURE — 3008F BODY MASS INDEX DOCD: CPT | Mod: HCNC,CPTII,S$GLB, | Performed by: FAMILY MEDICINE

## 2021-09-21 PROCEDURE — 3008F PR BODY MASS INDEX (BMI) DOCUMENTED: ICD-10-PCS | Mod: HCNC,CPTII,S$GLB, | Performed by: FAMILY MEDICINE

## 2021-09-21 PROCEDURE — 1160F PR REVIEW ALL MEDS BY PRESCRIBER/CLIN PHARMACIST DOCUMENTED: ICD-10-PCS | Mod: HCNC,CPTII,S$GLB, | Performed by: FAMILY MEDICINE

## 2021-09-21 PROCEDURE — 3078F DIAST BP <80 MM HG: CPT | Mod: HCNC,CPTII,S$GLB, | Performed by: FAMILY MEDICINE

## 2021-09-21 PROCEDURE — 1159F MED LIST DOCD IN RCRD: CPT | Mod: HCNC,CPTII,S$GLB, | Performed by: FAMILY MEDICINE

## 2021-09-21 PROCEDURE — 99499 UNLISTED E&M SERVICE: CPT | Mod: S$GLB,,, | Performed by: FAMILY MEDICINE

## 2021-09-21 PROCEDURE — 1159F PR MEDICATION LIST DOCUMENTED IN MEDICAL RECORD: ICD-10-PCS | Mod: HCNC,CPTII,S$GLB, | Performed by: FAMILY MEDICINE

## 2021-09-21 PROCEDURE — 1160F RVW MEDS BY RX/DR IN RCRD: CPT | Mod: HCNC,CPTII,S$GLB, | Performed by: FAMILY MEDICINE

## 2021-09-21 PROCEDURE — 3078F PR MOST RECENT DIASTOLIC BLOOD PRESSURE < 80 MM HG: ICD-10-PCS | Mod: HCNC,CPTII,S$GLB, | Performed by: FAMILY MEDICINE

## 2021-09-21 PROCEDURE — 99499 RISK ADDL DX/OHS AUDIT: ICD-10-PCS | Mod: S$GLB,,, | Performed by: FAMILY MEDICINE

## 2021-09-21 PROCEDURE — 99999 PR PBB SHADOW E&M-EST. PATIENT-LVL IV: CPT | Mod: PBBFAC,HCNC,, | Performed by: FAMILY MEDICINE

## 2021-09-21 PROCEDURE — 99214 PR OFFICE/OUTPT VISIT, EST, LEVL IV, 30-39 MIN: ICD-10-PCS | Mod: HCNC,S$GLB,, | Performed by: FAMILY MEDICINE

## 2021-09-21 PROCEDURE — 99999 PR PBB SHADOW E&M-EST. PATIENT-LVL IV: ICD-10-PCS | Mod: PBBFAC,HCNC,, | Performed by: FAMILY MEDICINE

## 2021-09-21 PROCEDURE — 3075F SYST BP GE 130 - 139MM HG: CPT | Mod: HCNC,CPTII,S$GLB, | Performed by: FAMILY MEDICINE

## 2021-09-21 RX ORDER — LEVOTHYROXINE SODIUM 125 UG/1
125 TABLET ORAL DAILY
Qty: 30 TABLET | Refills: 0 | Status: SHIPPED | OUTPATIENT
Start: 2021-09-21 | End: 2021-12-09

## 2021-09-21 RX ORDER — ROSUVASTATIN CALCIUM 40 MG/1
40 TABLET, COATED ORAL NIGHTLY
Qty: 90 TABLET | Refills: 4 | Status: SHIPPED | OUTPATIENT
Start: 2021-09-21 | End: 2022-11-15

## 2021-09-21 RX ORDER — HYDROXYZINE HYDROCHLORIDE 25 MG/1
25 TABLET, FILM COATED ORAL 3 TIMES DAILY PRN
Qty: 90 TABLET | Refills: 0 | Status: SHIPPED | OUTPATIENT
Start: 2021-09-21 | End: 2023-03-17 | Stop reason: SDUPTHER

## 2021-09-21 RX ORDER — CLONAZEPAM 0.25 MG/1
0.25 TABLET, ORALLY DISINTEGRATING ORAL 2 TIMES DAILY PRN
Qty: 15 TABLET | Refills: 1 | Status: SHIPPED | OUTPATIENT
Start: 2021-09-21 | End: 2022-09-21

## 2021-10-07 ENCOUNTER — PATIENT OUTREACH (OUTPATIENT)
Dept: ADMINISTRATIVE | Facility: OTHER | Age: 66
End: 2021-10-07

## 2021-10-13 ENCOUNTER — OFFICE VISIT (OUTPATIENT)
Dept: OPTOMETRY | Facility: CLINIC | Age: 66
End: 2021-10-13
Payer: MEDICARE

## 2021-10-13 DIAGNOSIS — H25.13 NUCLEAR SCLEROSIS OF BOTH EYES: ICD-10-CM

## 2021-10-13 DIAGNOSIS — H04.123 BILATERAL DRY EYES: ICD-10-CM

## 2021-10-13 DIAGNOSIS — H40.013 OPEN ANGLE WITH BORDERLINE FINDINGS AND LOW GLAUCOMA RISK IN BOTH EYES: ICD-10-CM

## 2021-10-13 DIAGNOSIS — H52.7 REFRACTIVE ERROR: ICD-10-CM

## 2021-10-13 DIAGNOSIS — E05.00 GRAVES' DISEASE: Primary | ICD-10-CM

## 2021-10-13 PROCEDURE — 1159F MED LIST DOCD IN RCRD: CPT | Mod: HCNC,CPTII,S$GLB, | Performed by: OPTOMETRIST

## 2021-10-13 PROCEDURE — 99499 RISK ADDL DX/OHS AUDIT: ICD-10-PCS | Mod: S$GLB,,, | Performed by: OPTOMETRIST

## 2021-10-13 PROCEDURE — 1160F RVW MEDS BY RX/DR IN RCRD: CPT | Mod: HCNC,CPTII,S$GLB, | Performed by: OPTOMETRIST

## 2021-10-13 PROCEDURE — 1159F PR MEDICATION LIST DOCUMENTED IN MEDICAL RECORD: ICD-10-PCS | Mod: HCNC,CPTII,S$GLB, | Performed by: OPTOMETRIST

## 2021-10-13 PROCEDURE — 99999 PR PBB SHADOW E&M-EST. PATIENT-LVL III: ICD-10-PCS | Mod: PBBFAC,HCNC,, | Performed by: OPTOMETRIST

## 2021-10-13 PROCEDURE — 92004 PR EYE EXAM, NEW PATIENT,COMPREHESV: ICD-10-PCS | Mod: HCNC,S$GLB,, | Performed by: OPTOMETRIST

## 2021-10-13 PROCEDURE — 99999 PR PBB SHADOW E&M-EST. PATIENT-LVL III: CPT | Mod: PBBFAC,HCNC,, | Performed by: OPTOMETRIST

## 2021-10-13 PROCEDURE — 99499 UNLISTED E&M SERVICE: CPT | Mod: S$GLB,,, | Performed by: OPTOMETRIST

## 2021-10-13 PROCEDURE — 92004 COMPRE OPH EXAM NEW PT 1/>: CPT | Mod: HCNC,S$GLB,, | Performed by: OPTOMETRIST

## 2021-10-13 PROCEDURE — 1160F PR REVIEW ALL MEDS BY PRESCRIBER/CLIN PHARMACIST DOCUMENTED: ICD-10-PCS | Mod: HCNC,CPTII,S$GLB, | Performed by: OPTOMETRIST

## 2021-11-10 DIAGNOSIS — Z12.11 COLON CANCER SCREENING: ICD-10-CM

## 2021-11-19 ENCOUNTER — PATIENT OUTREACH (OUTPATIENT)
Dept: ADMINISTRATIVE | Facility: OTHER | Age: 66
End: 2021-11-19
Payer: MEDICARE

## 2021-11-24 ENCOUNTER — APPOINTMENT (RX ONLY)
Dept: URBAN - METROPOLITAN AREA CLINIC 84 | Facility: CLINIC | Age: 66
Setting detail: DERMATOLOGY
End: 2021-11-24

## 2021-11-24 DIAGNOSIS — D22 MELANOCYTIC NEVI: ICD-10-CM

## 2021-11-24 DIAGNOSIS — L72.0 EPIDERMAL CYST: ICD-10-CM

## 2021-11-24 DIAGNOSIS — L82.1 OTHER SEBORRHEIC KERATOSIS: ICD-10-CM

## 2021-11-24 DIAGNOSIS — L57.8 OTHER SKIN CHANGES DUE TO CHRONIC EXPOSURE TO NONIONIZING RADIATION: ICD-10-CM | Status: INADEQUATELY CONTROLLED

## 2021-11-24 DIAGNOSIS — L81.4 OTHER MELANIN HYPERPIGMENTATION: ICD-10-CM

## 2021-11-24 PROBLEM — D48.5 NEOPLASM OF UNCERTAIN BEHAVIOR OF SKIN: Status: ACTIVE | Noted: 2021-11-24

## 2021-11-24 PROBLEM — D22.5 MELANOCYTIC NEVI OF TRUNK: Status: ACTIVE | Noted: 2021-11-24

## 2021-11-24 PROBLEM — D22.61 MELANOCYTIC NEVI OF RIGHT UPPER LIMB, INCLUDING SHOULDER: Status: ACTIVE | Noted: 2021-11-24

## 2021-11-24 PROBLEM — D22.62 MELANOCYTIC NEVI OF LEFT UPPER LIMB, INCLUDING SHOULDER: Status: ACTIVE | Noted: 2021-11-24

## 2021-11-24 PROCEDURE — ? PRESCRIPTION

## 2021-11-24 PROCEDURE — ? COUNSELING

## 2021-11-24 PROCEDURE — 11103 TANGNTL BX SKIN EA SEP/ADDL: CPT

## 2021-11-24 PROCEDURE — ? DEFER

## 2021-11-24 PROCEDURE — 99203 OFFICE O/P NEW LOW 30 MIN: CPT | Mod: 25

## 2021-11-24 PROCEDURE — 11102 TANGNTL BX SKIN SINGLE LES: CPT

## 2021-11-24 PROCEDURE — ? FULL BODY SKIN EXAM - DECLINED

## 2021-11-24 PROCEDURE — ? BIOPSY BY SHAVE METHOD

## 2021-11-24 RX ORDER — DOXYCYCLINE HYCLATE 100 MG/1
CAPSULE, GELATIN COATED ORAL BID
Qty: 28 | Refills: 0 | Status: ERX | COMMUNITY
Start: 2021-11-24

## 2021-11-24 RX ADMIN — DOXYCYCLINE HYCLATE: 100 CAPSULE, GELATIN COATED ORAL at 00:00

## 2021-11-24 ASSESSMENT — LOCATION DETAILED DESCRIPTION DERM
LOCATION DETAILED: RIGHT ANTERIOR PROXIMAL UPPER ARM
LOCATION DETAILED: SUPERIOR THORACIC SPINE
LOCATION DETAILED: RIGHT INFERIOR CENTRAL MALAR CHEEK
LOCATION DETAILED: LEFT ANTERIOR DISTAL UPPER ARM
LOCATION DETAILED: MIDDLE STERNUM
LOCATION DETAILED: LEFT ANTERIOR PROXIMAL UPPER ARM
LOCATION DETAILED: LOWER STERNUM
LOCATION DETAILED: EPIGASTRIC SKIN

## 2021-11-24 ASSESSMENT — LOCATION SIMPLE DESCRIPTION DERM
LOCATION SIMPLE: LEFT UPPER ARM
LOCATION SIMPLE: RIGHT UPPER ARM
LOCATION SIMPLE: CHEST
LOCATION SIMPLE: UPPER BACK
LOCATION SIMPLE: ABDOMEN
LOCATION SIMPLE: RIGHT CHEEK

## 2021-11-24 ASSESSMENT — LOCATION ZONE DERM
LOCATION ZONE: FACE
LOCATION ZONE: ARM
LOCATION ZONE: TRUNK

## 2021-11-24 NOTE — PROCEDURE: BIOPSY BY SHAVE METHOD
Detail Level: Detailed
Depth Of Biopsy: dermis
Was A Bandage Applied: Yes
Size Of Lesion In Cm: 4
X Size Of Lesion In Cm: 3.2
Biopsy Type: H and E
Biopsy Method: Dermablade
Anesthesia Type: 1% lidocaine with epinephrine
Anesthesia Volume In Cc (Will Not Render If 0): 0.2
Additional Anesthesia Volume In Cc (Will Not Render If 0): 0
Hemostasis: Drysol
Wound Care: Petrolatum
Dressing: bandage
Destruction After The Procedure: No
Type Of Destruction Used: Curettage
Curettage Text: The wound bed was treated with curettage after the biopsy was performed.
Cryotherapy Text: The wound bed was treated with cryotherapy after the biopsy was performed.
Electrodesiccation Text: The wound bed was treated with electrodesiccation after the biopsy was performed.
Electrodesiccation And Curettage Text: The wound bed was treated with electrodesiccation and curettage after the biopsy was performed.
Silver Nitrate Text: The wound bed was treated with silver nitrate after the biopsy was performed.
Lab: 6
Lab Facility: 3
Consent: Written consent was obtained and risks were reviewed including but not limited to scarring, infection, bleeding, scabbing, incomplete removal, nerve damage and allergy to anesthesia.
Post-Care Instructions: I reviewed with the patient in detail post-care instructions. Patient is to keep the biopsy site dry overnight, and then apply bacitracin twice daily until healed. Patient may apply hydrogen peroxide soaks to remove any crusting.
Notification Instructions: Patient will be notified of biopsy results. However, patient instructed to call the office if not contacted within 2 weeks.
Billing Type: Third-Party Bill
Information: Selecting Yes will display possible errors in your note based on the variables you have selected. This validation is only offered as a suggestion for you. PLEASE NOTE THAT THE VALIDATION TEXT WILL BE REMOVED WHEN YOU FINALIZE YOUR NOTE. IF YOU WANT TO FAX A PRELIMINARY NOTE YOU WILL NEED TO TOGGLE THIS TO 'NO' IF YOU DO NOT WANT IT IN YOUR FAXED NOTE.

## 2021-11-24 NOTE — PROCEDURE: DEFER
Instructions (Optional): defered i&D; advised to call if excision desired in future
Introduction Text (Please End With A Colon): The following procedure was deferred:
Detail Level: Detailed

## 2021-12-01 ENCOUNTER — PATIENT OUTREACH (OUTPATIENT)
Dept: ADMINISTRATIVE | Facility: HOSPITAL | Age: 66
End: 2021-12-01
Payer: MEDICARE

## 2021-12-09 ENCOUNTER — PATIENT MESSAGE (OUTPATIENT)
Dept: FAMILY MEDICINE | Facility: CLINIC | Age: 66
End: 2021-12-09
Payer: MEDICARE

## 2022-01-14 ENCOUNTER — RX ONLY (OUTPATIENT)
Age: 67
Setting detail: RX ONLY
End: 2022-01-14

## 2022-01-14 RX ORDER — FLUOROURACIL 5 MG/G
CREAM TOPICAL
Qty: 40 | Refills: 1 | Status: ERX | COMMUNITY
Start: 2022-01-14

## 2022-01-21 DIAGNOSIS — F43.22 ADJUSTMENT DISORDER WITH ANXIETY: ICD-10-CM

## 2022-01-21 NOTE — TELEPHONE ENCOUNTER
Care Due:                  Date            Visit Type   Department     Provider  --------------------------------------------------------------------------------                                             [facility] FAMILY  Last Visit: 09-      None         MEDICINE       Drew Grant  Next Visit: None Scheduled  None         None Found                                                            Last  Test          Frequency    Reason                     Performed    Due Date  --------------------------------------------------------------------------------    CMP.........  12 months..  rosuvastatin.............  Not Found    Overdue    Lipid Panel.  12 months..  rosuvastatin.............  Not Found    Overdue    Powered by [employer]. Reference number: [id number].   1/21/2022 12:56:46 PM CST

## 2022-01-24 ENCOUNTER — PATIENT MESSAGE (OUTPATIENT)
Dept: FAMILY MEDICINE | Facility: CLINIC | Age: 67
End: 2022-01-24
Payer: MEDICARE

## 2022-01-24 DIAGNOSIS — F43.22 ADJUSTMENT DISORDER WITH ANXIETY: ICD-10-CM

## 2022-01-24 DIAGNOSIS — B02.9 HERPES ZOSTER WITHOUT COMPLICATION: Primary | ICD-10-CM

## 2022-01-24 RX ORDER — ACYCLOVIR 800 MG/1
800 TABLET ORAL 4 TIMES DAILY
Qty: 28 TABLET | Refills: 0 | Status: SHIPPED | OUTPATIENT
Start: 2022-01-24 | End: 2022-01-31

## 2022-01-24 RX ORDER — ESCITALOPRAM OXALATE 20 MG/1
20 TABLET ORAL DAILY
Qty: 90 TABLET | Refills: 1 | Status: SHIPPED | OUTPATIENT
Start: 2022-01-24 | End: 2022-09-02

## 2022-01-24 NOTE — TELEPHONE ENCOUNTER
No new care gaps identified.  Powered by [employer]. Reference number: [id number].   1/24/2022 1:49:57 PM CST

## 2022-02-07 RX ORDER — ESCITALOPRAM OXALATE 20 MG/1
TABLET ORAL
Qty: 30 TABLET | Refills: 0 | OUTPATIENT
Start: 2022-02-07

## 2022-03-04 ENCOUNTER — PATIENT MESSAGE (OUTPATIENT)
Dept: FAMILY MEDICINE | Facility: CLINIC | Age: 67
End: 2022-03-04
Payer: MEDICARE

## 2022-03-31 ENCOUNTER — PATIENT OUTREACH (OUTPATIENT)
Dept: ADMINISTRATIVE | Facility: HOSPITAL | Age: 67
End: 2022-03-31
Payer: MEDICARE

## 2022-03-31 NOTE — PROGRESS NOTES
Non-compliant report chart audits for BREAST CANCER SCREENING. Chart review completed for  test overdue (mammograms, Colonoscopies, pap smears, DM labs, and/or EYE EXAMs)      Care Everywhere and media, updates requested and reviewed.      DIS reviewed for test needed.  Mammogram      Outreach to patient in reference to breast cancer screening.  RE:  Patient needs to schedule mammogram.

## 2022-03-31 NOTE — PROGRESS NOTES
Non-compliant report chart audits for CERVICAL CANCER SCREENING. Chart review completed for HM test overdue (mammograms, Colonoscopies, pap smears, DM labs, and/or EYE EXAMs)      Care Everywhere and media, updates requested and reviewed.      [facility] and [facility] reviewed.      Outreach to patient in reference to cervical cancer screening.    Outreach:  Cervical cancer screening

## 2022-03-31 NOTE — PROGRESS NOTES
Non-compliant report chart audits for COLON CANCER SCREENING. Chart review completed for HM test overdue (mammograms, Colonoscopies, pap smears, DM labs, and/or EYE EXAMs)      Care Everywhere and media, updates requested and reviewed.      [facility] and [facility] reviewed.    DIS reviewed for test needed.  Mammogram      Outreach to patient in reference to colon cancer screening.  RE:  Patient needs colon cancer screening    Outreach:  Colon cancer screening

## 2022-05-09 ENCOUNTER — PATIENT MESSAGE (OUTPATIENT)
Dept: SMOKING CESSATION | Facility: CLINIC | Age: 67
End: 2022-05-09
Payer: MEDICARE

## 2022-05-17 ENCOUNTER — TELEPHONE (OUTPATIENT)
Dept: ADMINISTRATIVE | Facility: CLINIC | Age: 67
End: 2022-05-17
Payer: MEDICARE

## 2022-05-19 ENCOUNTER — TELEPHONE (OUTPATIENT)
Dept: FAMILY MEDICINE | Facility: CLINIC | Age: 67
End: 2022-05-19
Payer: MEDICARE

## 2022-05-19 NOTE — TELEPHONE ENCOUNTER
[facility] [employer] [facility] - lvm to return call to [phone]

## 2022-05-31 ENCOUNTER — PATIENT MESSAGE (OUTPATIENT)
Dept: ADMINISTRATIVE | Facility: HOSPITAL | Age: 67
End: 2022-05-31
Payer: MEDICARE

## 2022-06-01 DIAGNOSIS — E05.00 GRAVES' DISEASE: ICD-10-CM

## 2022-06-01 DIAGNOSIS — F43.22 ADJUSTMENT DISORDER WITH ANXIETY: ICD-10-CM

## 2022-06-01 DIAGNOSIS — E78.01 FAMILIAL HYPERCHOLESTEROLEMIA: ICD-10-CM

## 2022-06-01 DIAGNOSIS — E03.9 HYPOTHYROIDISM (ACQUIRED): ICD-10-CM

## 2022-06-01 RX ORDER — ESCITALOPRAM OXALATE 20 MG/1
TABLET ORAL
Qty: 90 TABLET | Refills: 0 | OUTPATIENT
Start: 2022-06-01

## 2022-06-01 RX ORDER — LEVOTHYROXINE SODIUM 125 UG/1
TABLET ORAL
Qty: 90 TABLET | Refills: 0 | OUTPATIENT
Start: 2022-06-01

## 2022-06-01 RX ORDER — ROSUVASTATIN CALCIUM 40 MG/1
TABLET, COATED ORAL
Qty: 90 TABLET | Refills: 0 | OUTPATIENT
Start: 2022-06-01

## 2022-06-01 NOTE — TELEPHONE ENCOUNTER
Care Due:                  Date            Visit Type   Department     Provider  --------------------------------------------------------------------------------                                EP -                              PRIMARY      [facility] FAMILY  Last Visit: 09-      CARE ([facility])   MEDICINE       Drew Grant  Next Visit: None Scheduled  None         None Found                                                            Last  Test          Frequency    Reason                     Performed    Due Date  --------------------------------------------------------------------------------    CMP.........  12 months..  rosuvastatin.............  Not Found    Overdue    Lipid Panel.  12 months..  rosuvastatin.............  Not Found    Overdue    [employer] Embedded Care Gaps. Reference number: [id number]. 6/01/2022   10:52:33 AM CDT

## 2022-06-01 NOTE — TELEPHONE ENCOUNTER
Christos DC. Inappropriate Request    Refill Authorization Note   Jackie Vanessa  is requesting a refill authorization.  Brief Assessment and Rationale for Refill:  Quick Discontinue  Medication Therapy Plan:    Pharmacy is requesting new scripts for the following medications without required information, (sig/ frequency/qty/etc)       Medication Reconciliation Completed:  No      Comments: Pharmacies have been requesting medications for patients without required information, (sig, frequency, qty, etc.). In addition, requests are sent for medication(s) pt. are currently not taking, and medications patients have never taken.    We have spoken to the pharmacies about these request types and advised their teams previously that we are unable to assess these New Script requests and require all details for these requests. This is a known issue and has been reported.        Note composed:1:59 PM 06/01/2022

## 2022-06-01 NOTE — TELEPHONE ENCOUNTER
No new care gaps identified.  [facility] Embedded Care Gaps. Reference number: [id number]. 6/01/2022   10:52:55 AM SHAYLAT

## 2022-06-01 NOTE — TELEPHONE ENCOUNTER
Christos DC. Inappropriate Request    Refill Authorization Note   Jackie Vanessa  is requesting a refill authorization.  Brief Assessment and Rationale for Refill:  Quick Discontinue  Medication Therapy Plan:    Pharmacy is requesting new scripts for the following medications without required information, (sig/ frequency/qty/etc)       Medication Reconciliation Completed:  No      Comments: Pharmacies have been requesting medications for patients without required information, (sig, frequency, qty, etc.). In addition, requests are sent for medication(s) pt. are currently not taking, and medications patients have never taken.    We have spoken to the pharmacies about these request types and advised their teams previously that we are unable to assess these New Script requests and require all details for these requests. This is a known issue and has been reported.        Note composed:1:55 PM 06/01/2022

## 2022-06-01 NOTE — TELEPHONE ENCOUNTER
No new care gaps identified.  [facility] Embedded Care Gaps. Reference number: [id number]. 6/01/2022   10:53:48 AM SHAYLAT

## 2022-08-23 ENCOUNTER — PATIENT MESSAGE (OUTPATIENT)
Dept: FAMILY MEDICINE | Facility: CLINIC | Age: 67
End: 2022-08-23
Payer: MEDICARE

## 2022-08-24 ENCOUNTER — PATIENT MESSAGE (OUTPATIENT)
Dept: ADMINISTRATIVE | Facility: HOSPITAL | Age: 67
End: 2022-08-24
Payer: MEDICARE

## 2022-09-14 DIAGNOSIS — Z78.0 MENOPAUSE: ICD-10-CM

## 2023-01-27 DIAGNOSIS — E03.9 HYPOTHYROIDISM (ACQUIRED): ICD-10-CM

## 2023-01-27 DIAGNOSIS — E05.00 GRAVES' DISEASE: ICD-10-CM

## 2023-01-27 NOTE — TELEPHONE ENCOUNTER
----- Message from Kayla Davila sent at 1/27/2023 10:17 AM CST -----  Contact: MARICEL HOGAN [phone]  Type:  RX Refill Request    Who Called:  Pt   Refill or New Rx:  Refill  RX Name and Strength: levothyroxine (SYNTHROID) 125 MCG tablet     [facility] - [facility] [address] [address] [facility]  [address] [facility] [address] [address]  Phone: [phone] Fax: [phone]      How is the patient currently taking it? (ex. 1XDay):  1X day  Is this a 30 day or 90 day RX:  30  Preferred Pharmacy with phone number:  Phone: [phone]   Local or Mail Order:  Mail  Ordering Provider:  Rudy Angeles Call Back Number:  [phone] (home)     Additional Information: Patient is calling office regarding Rx refill on  levothyroxine (SYNTHROID) 125 MCG tablet. Patient states is completely out of meds. Please call back and advise.

## 2023-01-27 NOTE — TELEPHONE ENCOUNTER
Care Due:                  Date            Visit Type   Department     Provider  --------------------------------------------------------------------------------                                EP -                              PRIMARY      [facility] FAMILY  Last Visit: 09-      CARE ([facility])   STEVE Grant                               -                              [facility]  Next Visit: 03-      CARE ([facility])   [facility]       Drew Grant                                                            Last  Test          Frequency    Reason                     Performed    Due Date  --------------------------------------------------------------------------------    CBC.........  12 months..  acyclovir................  Not Found    Overdue    CMP.........  12 months..  acyclovir, rosuvastatin..  Not Found    Overdue    Lipid Panel.  12 months..  rosuvastatin.............  Not Found    Overdue    [employer] Embedded Care Gaps. Reference number: [id number]. 1/27/2023   3:22:01 PM CST

## 2023-01-29 RX ORDER — LEVOTHYROXINE SODIUM 125 UG/1
125 TABLET ORAL DAILY
Qty: 90 TABLET | Refills: 0 | Status: SHIPPED | OUTPATIENT
Start: 2023-01-29 | End: 2023-02-13 | Stop reason: SDUPTHER

## 2023-02-09 DIAGNOSIS — E05.00 GRAVES' DISEASE: ICD-10-CM

## 2023-02-09 DIAGNOSIS — E03.9 HYPOTHYROIDISM (ACQUIRED): ICD-10-CM

## 2023-02-09 NOTE — TELEPHONE ENCOUNTER
----- Message from Joy Govea sent at 2/9/2023  2:52 PM CST -----  Regarding: pt script transfer  Name of Who is Calling: MARICEL HOGAN [[id number]]      What is the request in detail:Please transfer pt medication  levothyroxine (EUTHYROX) 125 MCG tablet to [facility]      Can the clinic reply by [facility]: No      What Number [facility] Call Back [facility] [phone]        Please transfer pt script  to: [facility] mail in [id number] [place] [address] [phone]

## 2023-02-14 RX ORDER — LEVOTHYROXINE SODIUM 125 UG/1
125 TABLET ORAL DAILY
Qty: 90 TABLET | Refills: 0 | Status: SHIPPED | OUTPATIENT
Start: 2023-02-14 | End: 2023-04-17

## 2023-02-14 NOTE — TELEPHONE ENCOUNTER
Care Due:                  Date            Visit Type   Department     Provider  --------------------------------------------------------------------------------                                EP -                              PRIMARY      [facility] FAMILY  Last Visit: 09-      CARE ([facility])   STEVE Grant                               -                              [facility]  Next Visit: 03-      CARE ([facility])   [facility]       Drew Grant                                                            Last  Test          Frequency    Reason                     Performed    Due Date  --------------------------------------------------------------------------------    TSH.........  12 months..  levothyroxine............  Not Found    Overdue    [employer] Embedded Care Gaps. Reference number: [id number]. 2/13/2023   7:37:57 PM CST

## 2023-02-16 ENCOUNTER — OFFICE VISIT (OUTPATIENT)
Dept: OPTOMETRY | Facility: CLINIC | Age: 68
End: 2023-02-16
Payer: MEDICARE

## 2023-02-16 ENCOUNTER — HOSPITAL ENCOUNTER (OUTPATIENT)
Dept: RADIOLOGY | Facility: HOSPITAL | Age: 68
Discharge: HOME | End: 2023-02-16
Attending: FAMILY MEDICINE
Payer: MEDICARE

## 2023-02-16 DIAGNOSIS — H52.203 HYPEROPIA WITH ASTIGMATISM AND PRESBYOPIA, BILATERAL: ICD-10-CM

## 2023-02-16 DIAGNOSIS — H43.393 VITREOUS FLOATERS OF BOTH EYES: ICD-10-CM

## 2023-02-16 DIAGNOSIS — Z78.0 MENOPAUSE: ICD-10-CM

## 2023-02-16 DIAGNOSIS — H52.03 HYPEROPIA WITH ASTIGMATISM AND PRESBYOPIA, BILATERAL: ICD-10-CM

## 2023-02-16 DIAGNOSIS — H04.123 DRY EYE SYNDROME OF BILATERAL LACRIMAL GLANDS: ICD-10-CM

## 2023-02-16 DIAGNOSIS — H52.4 HYPEROPIA WITH ASTIGMATISM AND PRESBYOPIA, BILATERAL: ICD-10-CM

## 2023-02-16 DIAGNOSIS — E05.00 GRAVES' DISEASE: Primary | ICD-10-CM

## 2023-02-16 DIAGNOSIS — H25.13 NUCLEAR SCLEROSIS OF BOTH EYES: ICD-10-CM

## 2023-02-16 DIAGNOSIS — H40.013 OPEN ANGLE WITH BORDERLINE FINDINGS AND LOW GLAUCOMA RISK IN BOTH EYES: ICD-10-CM

## 2023-02-16 PROCEDURE — 77080 DXA BONE DENSITY AXIAL: CPT | Mod: 26,,, | Performed by: RADIOLOGY

## 2023-02-16 PROCEDURE — 92133 CPTRZD OPH DX IMG PST SGM ON: CPT | Mod: S$GLB,,,

## 2023-02-16 PROCEDURE — 92015 DETERMINE REFRACTIVE STATE: CPT | Mod: S$GLB,,,

## 2023-02-16 PROCEDURE — 1126F PR PAIN SEVERITY QUANTIFIED, NO PAIN PRESENT: ICD-10-PCS | Mod: CPTII,S$GLB,,

## 2023-02-16 PROCEDURE — 99999 PR PBB SHADOW E&M-EST. PATIENT-LVL II: CPT | Mod: PBBFAC,,,

## 2023-02-16 PROCEDURE — 1101F PR PT FALLS ASSESS DOC 0-1 FALLS W/OUT INJ PAST YR: ICD-10-PCS | Mod: CPTII,S$GLB,,

## 2023-02-16 PROCEDURE — 77080 DXA BONE DENSITY AXIAL: CPT | Mod: TC,PO

## 2023-02-16 PROCEDURE — 92133 PR COMPUTERIZED OPHTHALMIC IMAGING OPTIC NERVE: ICD-10-PCS | Mod: S$GLB,,,

## 2023-02-16 PROCEDURE — 92014 COMPRE OPH EXAM EST PT 1/>: CPT | Mod: S$GLB,,,

## 2023-02-16 PROCEDURE — 2023F DILAT RTA XM W/O RTNOPTHY: CPT | Mod: CPTII,S$GLB,,

## 2023-02-16 PROCEDURE — 2023F PR DILATED RETINAL EXAM W/O EVID OF RETINOPATHY: ICD-10-PCS | Mod: CPTII,S$GLB,,

## 2023-02-16 PROCEDURE — 77080 DEXA BONE DENSITY SPINE HIP: ICD-10-PCS | Mod: 26,,, | Performed by: RADIOLOGY

## 2023-02-16 PROCEDURE — 92015 PR REFRACTION: ICD-10-PCS | Mod: S$GLB,,,

## 2023-02-16 PROCEDURE — 1101F PT FALLS ASSESS-DOCD LE1/YR: CPT | Mod: CPTII,S$GLB,,

## 2023-02-16 PROCEDURE — 1126F AMNT PAIN NOTED NONE PRSNT: CPT | Mod: CPTII,S$GLB,,

## 2023-02-16 PROCEDURE — 3288F FALL RISK ASSESSMENT DOCD: CPT | Mod: CPTII,S$GLB,,

## 2023-02-16 PROCEDURE — 99999 PR PBB SHADOW E&M-EST. PATIENT-LVL II: ICD-10-PCS | Mod: PBBFAC,,,

## 2023-02-16 PROCEDURE — 3288F PR FALLS RISK ASSESSMENT DOCUMENTED: ICD-10-PCS | Mod: CPTII,S$GLB,,

## 2023-02-16 PROCEDURE — 1159F PR MEDICATION LIST DOCUMENTED IN MEDICAL RECORD: ICD-10-PCS | Mod: CPTII,S$GLB,,

## 2023-02-16 PROCEDURE — 1159F MED LIST DOCD IN RCRD: CPT | Mod: CPTII,S$GLB,,

## 2023-02-16 PROCEDURE — 92014 PR EYE EXAM, EST PATIENT,COMPREHESV: ICD-10-PCS | Mod: S$GLB,,,

## 2023-02-16 NOTE — PROGRESS NOTES
HPI    New pt here for annual exam. Last exam- 10/13/2021  H/o Graves Dx    Pt sts glasses are broken, but glasses are decent. Pt sts she was never   really happy with the glasses due to the optical shop not correctly   marking bi-focal. Pt has floaters OU, no changes. Pt see's occasional blur   flash since hitting head x 10 years.  Pt denies pain. Pt uses AT's PRN and   a warming mask.   Last edited by Jada Burt on 2/16/2023  2:28 PM.        ROS    Positive for: Eyes  Negative for: Constitutional, Gastrointestinal, Neurological, Skin,   Genitourinary, Musculoskeletal, HENT, Endocrine, Cardiovascular,   Respiratory, Psychiatric, Allergic/Imm, Heme/Lymph  Last edited by Anna Estevez, OD on 2/16/2023  2:50 PM.        Assessment /Plan     For exam results, see Encounter Report.    Graves' disease    Dry eye syndrome of bilateral lacrimal glands    Nuclear sclerosis of both eyes    Open angle with borderline findings and low glaucoma risk in both eyes  -     Posterior Segment OCT Optic Nerve- Both eyes  -     Machado Visual Field - OU - Extended - Both Eyes; Future; Expected date: 03/16/2023    Vitreous floaters of both eyes    Hyperopia with astigmatism and presbyopia, bilateral      1-2. Longstanding Graves' Disease. Proptosis and mild lid retraction. Chronic dry eyes, uses PF AT's prn. 1+ SPK noted OU. Ed pt increase use to BID-QID and incorporate gel drop at bedtime. Continue with warm compresses. Ed pt to call or message if no improvement or worsening of symptoms, may consider Restasis in the future.  3. Mild, not visually significant. Ed pt on signs and symptoms of worsening cataracts including reduced BCVA and glare. Surgery not recommended at this time. Continue to monitor yearly.  4. Moderate CD ratio. IOP higher today than previous exam with tonopen. Baseline RNFL OCT obtained showing mild thinning OD>OS in superior quadrant. Pt did not complete visual field testing as scheduled last year, will have  pt return for baseline 24-2 HVF.   5. Ed pt on floaters and their overall benign nature. Ed on signs and symptoms of a RD and to return asap if experienced.  6. Updated pt's spec rx and released final copy. Ed pt on change in rx and adaptation.    RTC: ~1 month for 24-2 HVF

## 2023-02-17 ENCOUNTER — TELEPHONE (OUTPATIENT)
Dept: OPTOMETRY | Facility: CLINIC | Age: 68
End: 2023-02-17
Payer: MEDICARE

## 2023-03-01 ENCOUNTER — TELEPHONE (OUTPATIENT)
Dept: FAMILY MEDICINE | Facility: CLINIC | Age: 68
End: 2023-03-01
Payer: MEDICARE

## 2023-03-01 ENCOUNTER — E-VISIT (OUTPATIENT)
Dept: FAMILY MEDICINE | Facility: CLINIC | Age: 68
End: 2023-03-01
Payer: MEDICARE

## 2023-03-01 DIAGNOSIS — N30.00 ACUTE INFECTIVE CYSTITIS: Primary | ICD-10-CM

## 2023-03-01 PROCEDURE — 99212 PR OFFICE/OUTPT VISIT, EST, LEVL II, 10-19 MIN: ICD-10-PCS | Mod: 95,,, | Performed by: FAMILY MEDICINE

## 2023-03-01 PROCEDURE — 99212 OFFICE O/P EST SF 10 MIN: CPT | Mod: 95,,, | Performed by: FAMILY MEDICINE

## 2023-03-01 NOTE — PROGRESS NOTES
THIS DOCUMENT WAS MADE IN PART WITH VOICE RECOGNITION SOFTWARE.  OCCASIONALLY THIS SOFTWARE WILL MISINTERPRET WORDS OR PHRASES.    Assessment and Plan:  1. Acute infective cystitis            PLAN    Sent Bactrim DS.  Message to patient giving ER precautions      ______________________________________________________________________  Subjective:    Chief Complaint:  Bladder infection     HPI:  Jackie is a 67 y.o. year old     E visit :     Patient with symptoms of dysuria, frequency  Typical of her normal urinary tract infections   Denies any systemic symptoms  We did call patient to try to get her to come in for appointment, having money problems, deferred.    Past Medical History:  Past Medical History:   Diagnosis Date    Abnormal colonoscopy 3/21/2019    Colon polyp at  2018 repeat 5 years.     Graves' disease 3/21/2019    Hyperlipidemia, mixed 3/21/2019    Hypothyroidism (acquired) 3/21/2019    Lyme disease        Past Surgical History:  Past Surgical History:   Procedure Laterality Date    BREAST BIOPSY Left     10+ years ago - Benign       Family History:  Family History   Problem Relation Age of Onset    Breast cancer Mother 60    Breast cancer Maternal Grandmother 75    Glaucoma Neg Hx     Macular degeneration Neg Hx     Retinal detachment Neg Hx        Social History:  Social History     Socioeconomic History    Marital status:    Tobacco Use    Smoking status: Every Day     Packs/day: 0.50     Years: 48.00     Pack years: 24.00     Types: Cigarettes    Smokeless tobacco: Never       Medications:  Current Outpatient Medications on File Prior to Visit   Medication Sig Dispense Refill    acyclovir (ZOVIRAX) 800 MG Tab Take 1 tablet (800 mg total) by mouth 4 (four) times daily. for 7 days 28 tablet 0    clonazePAM (KLONOPIN) 0.25 MG TbDL Take 1 tablet (0.25 mg total) by mouth 2 (two) times daily as needed. 15 tablet 1    EScitalopram oxalate (LEXAPRO) 20 MG tablet Take 1 tablet by mouth once daily  90 tablet 0    hydrOXYzine HCL (ATARAX) 25 MG tablet Take 1 tablet (25 mg total) by mouth 3 (three) times daily as needed for Itching. 90 tablet 0    ketoconazole (NIZORAL) 2 % shampoo Apply topically twice a week. 120 mL 3    levothyroxine (EUTHYROX) 125 MCG tablet Take 1 tablet (125 mcg total) by mouth once daily. 90 tablet 0    mupirocin (BACTROBAN) 2 % ointment Apply topically 3 (three) times daily. 15 g 1    rosuvastatin (CRESTOR) 40 MG Tab TAKE 1 TABLET EVERY EVENING 90 tablet 4     No current facility-administered medications on file prior to visit.       Allergies:  Patient has no known allergies.    Immunizations:  Immunization History   Administered Date(s) Administered    COVID-19, MRNA, LN-S, PF (MODERNA FULL 0.5 ML DOSE) 04/16/2021, 05/20/2021    Pneumococcal Polysaccharide - 23 Valent 10/10/2019       Review of Systems:  Review of Systems    Objective:    Vitals:  There were no vitals filed for this visit.    Physical Exam        Drew Grant MD  Family Medicine

## 2023-03-02 ENCOUNTER — PATIENT MESSAGE (OUTPATIENT)
Dept: FAMILY MEDICINE | Facility: CLINIC | Age: 68
End: 2023-03-02
Payer: MEDICARE

## 2023-03-02 ENCOUNTER — TELEPHONE (OUTPATIENT)
Dept: FAMILY MEDICINE | Facility: CLINIC | Age: 68
End: 2023-03-02
Payer: MEDICARE

## 2023-03-02 DIAGNOSIS — N30.00 ACUTE INFECTIVE CYSTITIS: Primary | ICD-10-CM

## 2023-03-02 RX ORDER — SULFAMETHOXAZOLE AND TRIMETHOPRIM 800; 160 MG/1; MG/1
1 TABLET ORAL 2 TIMES DAILY
Qty: 10 TABLET | Refills: 0 | Status: SHIPPED | OUTPATIENT
Start: 2023-03-02 | End: 2023-03-17

## 2023-03-02 NOTE — TELEPHONE ENCOUNTER
[facility]  [address] [place] [address]      Telephone Encounter     Patient: Jackie Vanessa  YOB: 1955    Summary:    Patient on my schedule for e-visit.     Called patient, she reports history of kidney infections and recurrent UTIs. Current episode gradual in onset, no fever.     Informed that she would need to come in person for urinalysis/culture. Recommended urgent care visit as our office closed for the day at time of call. She said she would rather wait and have appointment tomorrow but is limited with transportation. Told her either in person appt with me or virtual visit with Dr Rudy jennings if he has availability, but that she likely will need to come in person for lab testing regardless.      Instructed to seek care if she develops worsening symptoms, to ER with back pain, fever, confusion or other severe symptoms.       Chelly Taylor PA-C

## 2023-03-02 NOTE — TELEPHONE ENCOUNTER
Called pt to sched appt per KARINA Taylor. As she would like to get UA. Pt declined stating that she has gotten these since she was 15 yrs old and she knows it started because she drank OJ. States she does not have transportation and cannot afford to pay for transportation. Advised pt that I would let Dr Grant know. Call dropped. Please advise. WIll try and call pt back.

## 2023-03-03 ENCOUNTER — PATIENT OUTREACH (OUTPATIENT)
Dept: ADMINISTRATIVE | Facility: HOSPITAL | Age: 68
End: 2023-03-03
Payer: MEDICARE

## 2023-03-03 ENCOUNTER — PATIENT MESSAGE (OUTPATIENT)
Dept: ADMINISTRATIVE | Facility: HOSPITAL | Age: 68
End: 2023-03-03
Payer: MEDICARE

## 2023-03-03 NOTE — PROGRESS NOTES
2023 Care Everywhere updates requested and reviewed.  Immunizations reconciled. Media reports reviewed.  Duplicate HM overrides and  orders removed.  Overdue HM topic chart audit and/or requested.  Overdue lab testing linked to upcoming lab appointments if applies.  Lab ActivIdentity, and MyTinks reviewed    Lab testing     DIS reviewed      Mammogram and LDCT    Health Maintenance Due   Topic Date Due    HIV Screening  Never done    Colorectal Cancer Screening  Never done    LDCT Lung Screen  Never done    Shingles Vaccine (1 of 2) Never done    Pneumococcal Vaccines (Age 65+) (2 - PCV) 10/10/2020    TETANUS VACCINE  2021    COVID-19 Vaccine (3 - Booster for Moderna series) 07/15/2021    Mammogram  2022    Influenza Vaccine (1) Never done

## 2023-03-03 NOTE — LETTER
March 10, 2023    Jackie Vanessa  [address] [place] [address]             [facility]  [address]  [place] [address]  Phone: [phone] Dear Deborah [facility] is committed to your overall health.  To help you get the most out of each of your visits, we will review your information to make sure you are up to date on all of your recommended tests and/or procedures.       Drew Grant MD  has found that your chart shows you may be due for :         Health Maintenance Due   Topic    HIV Screening     Colorectal Cancer Screening     LDCT Lung Screen     Shingles Vaccine (1 of 2)    Pneumococcal Vaccines (Age 65+) (2 - PCV)    TETANUS VACCINE     COVID-19 Vaccine (3 - Booster for Moderna series)    Mammogram     Influenza Vaccine (1)         If you have had any of the above done at another facility, please bring the records or information with you so that your record at [facility] will be complete.  If you would like to schedule any of these test, please call [phone] or send a message via [email or web address].        If you are currently taking medication, please bring it with you to your appointment for review.           Thank you for letting us care for you,        Drew Grant MD and your [facility] Team

## 2023-03-17 ENCOUNTER — OFFICE VISIT (OUTPATIENT)
Dept: FAMILY MEDICINE | Facility: CLINIC | Age: 68
End: 2023-03-17
Payer: MEDICARE

## 2023-03-17 ENCOUNTER — LAB VISIT (OUTPATIENT)
Dept: LAB | Facility: HOSPITAL | Age: 68
End: 2023-03-17
Attending: FAMILY MEDICINE
Payer: MEDICARE

## 2023-03-17 VITALS
HEIGHT: 65 IN | WEIGHT: 116.06 LBS | HEART RATE: 85 BPM | SYSTOLIC BLOOD PRESSURE: 128 MMHG | OXYGEN SATURATION: 98 % | BODY MASS INDEX: 19.34 KG/M2 | DIASTOLIC BLOOD PRESSURE: 72 MMHG

## 2023-03-17 DIAGNOSIS — R10.13 EPIGASTRIC PAIN: ICD-10-CM

## 2023-03-17 DIAGNOSIS — R39.9 LOWER URINARY TRACT SYMPTOMS: ICD-10-CM

## 2023-03-17 DIAGNOSIS — Z12.39 ENCOUNTER FOR SCREENING FOR MALIGNANT NEOPLASM OF BREAST, UNSPECIFIED SCREENING MODALITY: ICD-10-CM

## 2023-03-17 DIAGNOSIS — F43.22 ADJUSTMENT DISORDER WITH ANXIETY: ICD-10-CM

## 2023-03-17 DIAGNOSIS — Z79.899 DRUG THERAPY: Primary | ICD-10-CM

## 2023-03-17 DIAGNOSIS — Z79.899 DRUG THERAPY: ICD-10-CM

## 2023-03-17 DIAGNOSIS — F17.210 NICOTINE DEPENDENCE, CIGARETTES, UNCOMPLICATED: ICD-10-CM

## 2023-03-17 DIAGNOSIS — J30.1 SEASONAL ALLERGIC RHINITIS DUE TO POLLEN: ICD-10-CM

## 2023-03-17 DIAGNOSIS — Z12.31 ENCOUNTER FOR SCREENING MAMMOGRAM FOR MALIGNANT NEOPLASM OF BREAST: ICD-10-CM

## 2023-03-17 DIAGNOSIS — Z23 IMMUNIZATION DUE: ICD-10-CM

## 2023-03-17 DIAGNOSIS — Z12.11 COLON CANCER SCREENING: ICD-10-CM

## 2023-03-17 LAB
ALBUMIN SERPL BCP-MCNC: 4.1 G/DL (ref 3.5–5.2)
ALP SERPL-CCNC: 65 U/L (ref 55–135)
ALT SERPL W/O P-5'-P-CCNC: 18 U/L (ref 10–44)
ANION GAP SERPL CALC-SCNC: 12 MMOL/L (ref 8–16)
AST SERPL-CCNC: 15 U/L (ref 10–40)
BASOPHILS # BLD AUTO: 0.12 K/UL (ref 0–0.2)
BASOPHILS NFR BLD: 1.1 % (ref 0–1.9)
BILIRUB SERPL-MCNC: 0.4 MG/DL (ref 0.1–1)
BILIRUBIN, UA POC OHS: NEGATIVE
BLOOD, UA POC OHS: NEGATIVE
BUN SERPL-MCNC: 21 MG/DL (ref 8–23)
CALCIUM SERPL-MCNC: 9.7 MG/DL (ref 8.7–10.5)
CHLORIDE SERPL-SCNC: 106 MMOL/L (ref 95–110)
CHOLEST SERPL-MCNC: 222 MG/DL (ref 120–199)
CHOLEST/HDLC SERPL: 2.4 {RATIO} (ref 2–5)
CLARITY, UA POC OHS: CLEAR
CO2 SERPL-SCNC: 22 MMOL/L (ref 23–29)
COLOR, UA POC OHS: YELLOW
CREAT SERPL-MCNC: 1.1 MG/DL (ref 0.5–1.4)
DIFFERENTIAL METHOD: ABNORMAL
EOSINOPHIL # BLD AUTO: 0.2 K/UL (ref 0–0.5)
EOSINOPHIL NFR BLD: 2.2 % (ref 0–8)
ERYTHROCYTE [DISTWIDTH] IN BLOOD BY AUTOMATED COUNT: 14.8 % (ref 11.5–14.5)
EST. GFR  (NO RACE VARIABLE): 55.8 ML/MIN/1.73 M^2
ESTIMATED AVG GLUCOSE: 114 MG/DL (ref 68–131)
GLUCOSE SERPL-MCNC: 109 MG/DL (ref 70–110)
GLUCOSE, UA POC OHS: NEGATIVE
HBA1C MFR BLD: 5.6 % (ref 4–5.6)
HCT VFR BLD AUTO: 44.1 % (ref 37–48.5)
HDLC SERPL-MCNC: 92 MG/DL (ref 40–75)
HDLC SERPL: 41.4 % (ref 20–50)
HGB BLD-MCNC: 13.7 G/DL (ref 12–16)
HIV 1+2 AB+HIV1 P24 AG SERPL QL IA: NORMAL
IMM GRANULOCYTES # BLD AUTO: 0.02 K/UL (ref 0–0.04)
IMM GRANULOCYTES NFR BLD AUTO: 0.2 % (ref 0–0.5)
KETONES, UA POC OHS: NEGATIVE
LDLC SERPL CALC-MCNC: 117 MG/DL (ref 63–159)
LEUKOCYTES, UA POC OHS: NEGATIVE
LYMPHOCYTES # BLD AUTO: 4.7 K/UL (ref 1–4.8)
LYMPHOCYTES NFR BLD: 43.5 % (ref 18–48)
MCH RBC QN AUTO: 29.3 PG (ref 27–31)
MCHC RBC AUTO-ENTMCNC: 31.1 G/DL (ref 32–36)
MCV RBC AUTO: 94 FL (ref 82–98)
MONOCYTES # BLD AUTO: 0.7 K/UL (ref 0.3–1)
MONOCYTES NFR BLD: 6.1 % (ref 4–15)
NEUTROPHILS # BLD AUTO: 5.1 K/UL (ref 1.8–7.7)
NEUTROPHILS NFR BLD: 46.9 % (ref 38–73)
NITRITE, UA POC OHS: NEGATIVE
NONHDLC SERPL-MCNC: 130 MG/DL
NRBC BLD-RTO: 0 /100 WBC
PH, UA POC OHS: 6
PLATELET # BLD AUTO: 347 K/UL (ref 150–450)
PMV BLD AUTO: 9 FL (ref 9.2–12.9)
POTASSIUM SERPL-SCNC: 4 MMOL/L (ref 3.5–5.1)
PROT SERPL-MCNC: 7.2 G/DL (ref 6–8.4)
PROTEIN, UA POC OHS: NEGATIVE
RBC # BLD AUTO: 4.67 M/UL (ref 4–5.4)
SODIUM SERPL-SCNC: 140 MMOL/L (ref 136–145)
SPECIFIC GRAVITY, UA POC OHS: 1.02
T4 FREE SERPL-MCNC: 1.46 NG/DL (ref 0.71–1.51)
TRIGL SERPL-MCNC: 65 MG/DL (ref 30–150)
TSH SERPL DL<=0.005 MIU/L-ACNC: 6.9 UIU/ML (ref 0.4–4)
UROBILINOGEN, UA POC OHS: 0.2
WBC # BLD AUTO: 10.84 K/UL (ref 3.9–12.7)

## 2023-03-17 PROCEDURE — 1159F PR MEDICATION LIST DOCUMENTED IN MEDICAL RECORD: ICD-10-PCS | Mod: CPTII,S$GLB,, | Performed by: FAMILY MEDICINE

## 2023-03-17 PROCEDURE — 3008F PR BODY MASS INDEX (BMI) DOCUMENTED: ICD-10-PCS | Mod: CPTII,S$GLB,, | Performed by: FAMILY MEDICINE

## 2023-03-17 PROCEDURE — 1160F PR REVIEW ALL MEDS BY PRESCRIBER/CLIN PHARMACIST DOCUMENTED: ICD-10-PCS | Mod: CPTII,S$GLB,, | Performed by: FAMILY MEDICINE

## 2023-03-17 PROCEDURE — 99999 PR PBB SHADOW E&M-EST. PATIENT-LVL IV: ICD-10-PCS | Mod: PBBFAC,,, | Performed by: FAMILY MEDICINE

## 2023-03-17 PROCEDURE — 36415 COLL VENOUS BLD VENIPUNCTURE: CPT | Mod: PN | Performed by: FAMILY MEDICINE

## 2023-03-17 PROCEDURE — 84439 ASSAY OF FREE THYROXINE: CPT | Performed by: FAMILY MEDICINE

## 2023-03-17 PROCEDURE — 3074F SYST BP LT 130 MM HG: CPT | Mod: CPTII,S$GLB,, | Performed by: FAMILY MEDICINE

## 2023-03-17 PROCEDURE — 3008F BODY MASS INDEX DOCD: CPT | Mod: CPTII,S$GLB,, | Performed by: FAMILY MEDICINE

## 2023-03-17 PROCEDURE — 1160F RVW MEDS BY RX/DR IN RCRD: CPT | Mod: CPTII,S$GLB,, | Performed by: FAMILY MEDICINE

## 2023-03-17 PROCEDURE — 3078F PR MOST RECENT DIASTOLIC BLOOD PRESSURE < 80 MM HG: ICD-10-PCS | Mod: CPTII,S$GLB,, | Performed by: FAMILY MEDICINE

## 2023-03-17 PROCEDURE — 84443 ASSAY THYROID STIM HORMONE: CPT | Performed by: FAMILY MEDICINE

## 2023-03-17 PROCEDURE — 99214 PR OFFICE/OUTPT VISIT, EST, LEVL IV, 30-39 MIN: ICD-10-PCS | Mod: S$GLB,,, | Performed by: FAMILY MEDICINE

## 2023-03-17 PROCEDURE — 85025 COMPLETE CBC W/AUTO DIFF WBC: CPT | Performed by: FAMILY MEDICINE

## 2023-03-17 PROCEDURE — 3288F PR FALLS RISK ASSESSMENT DOCUMENTED: ICD-10-PCS | Mod: CPTII,S$GLB,, | Performed by: FAMILY MEDICINE

## 2023-03-17 PROCEDURE — 81003 URINALYSIS AUTO W/O SCOPE: CPT | Mod: QW,S$GLB,, | Performed by: FAMILY MEDICINE

## 2023-03-17 PROCEDURE — 1159F MED LIST DOCD IN RCRD: CPT | Mod: CPTII,S$GLB,, | Performed by: FAMILY MEDICINE

## 2023-03-17 PROCEDURE — 1100F PR PT FALLS ASSESS DOC 2+ FALLS/FALL W/INJURY/YR: ICD-10-PCS | Mod: CPTII,S$GLB,, | Performed by: FAMILY MEDICINE

## 2023-03-17 PROCEDURE — 3074F PR MOST RECENT SYSTOLIC BLOOD PRESSURE < 130 MM HG: ICD-10-PCS | Mod: CPTII,S$GLB,, | Performed by: FAMILY MEDICINE

## 2023-03-17 PROCEDURE — 99214 OFFICE O/P EST MOD 30 MIN: CPT | Mod: S$GLB,,, | Performed by: FAMILY MEDICINE

## 2023-03-17 PROCEDURE — 87389 HIV-1 AG W/HIV-1&-2 AB AG IA: CPT | Performed by: FAMILY MEDICINE

## 2023-03-17 PROCEDURE — 83036 HEMOGLOBIN GLYCOSYLATED A1C: CPT | Performed by: FAMILY MEDICINE

## 2023-03-17 PROCEDURE — 3078F DIAST BP <80 MM HG: CPT | Mod: CPTII,S$GLB,, | Performed by: FAMILY MEDICINE

## 2023-03-17 PROCEDURE — 90677 PCV20 VACCINE IM: CPT | Mod: S$GLB,,, | Performed by: FAMILY MEDICINE

## 2023-03-17 PROCEDURE — 3288F FALL RISK ASSESSMENT DOCD: CPT | Mod: CPTII,S$GLB,, | Performed by: FAMILY MEDICINE

## 2023-03-17 PROCEDURE — 99999 PR PBB SHADOW E&M-EST. PATIENT-LVL IV: CPT | Mod: PBBFAC,,, | Performed by: FAMILY MEDICINE

## 2023-03-17 PROCEDURE — 90677 PNEUMOCOCCAL CONJUGATE VACCINE 20-VALENT: ICD-10-PCS | Mod: S$GLB,,, | Performed by: FAMILY MEDICINE

## 2023-03-17 PROCEDURE — G0009 ADMIN PNEUMOCOCCAL VACCINE: HCPCS | Mod: S$GLB,,, | Performed by: FAMILY MEDICINE

## 2023-03-17 PROCEDURE — 1126F PR PAIN SEVERITY QUANTIFIED, NO PAIN PRESENT: ICD-10-PCS | Mod: CPTII,S$GLB,, | Performed by: FAMILY MEDICINE

## 2023-03-17 PROCEDURE — 1126F AMNT PAIN NOTED NONE PRSNT: CPT | Mod: CPTII,S$GLB,, | Performed by: FAMILY MEDICINE

## 2023-03-17 PROCEDURE — G0009 PNEUMOCOCCAL CONJUGATE VACCINE 20-VALENT: ICD-10-PCS | Mod: S$GLB,,, | Performed by: FAMILY MEDICINE

## 2023-03-17 PROCEDURE — 1100F PTFALLS ASSESS-DOCD GE2>/YR: CPT | Mod: CPTII,S$GLB,, | Performed by: FAMILY MEDICINE

## 2023-03-17 PROCEDURE — 80061 LIPID PANEL: CPT | Performed by: FAMILY MEDICINE

## 2023-03-17 PROCEDURE — 81003 POCT URINALYSIS(INSTRUMENT): ICD-10-PCS | Mod: QW,S$GLB,, | Performed by: FAMILY MEDICINE

## 2023-03-17 PROCEDURE — 80053 COMPREHEN METABOLIC PANEL: CPT | Performed by: FAMILY MEDICINE

## 2023-03-17 RX ORDER — PANTOPRAZOLE SODIUM 40 MG/1
40 TABLET, DELAYED RELEASE ORAL DAILY
Qty: 60 TABLET | Refills: 2 | Status: SHIPPED | OUTPATIENT
Start: 2023-03-17 | End: 2023-12-10

## 2023-03-17 RX ORDER — ESCITALOPRAM OXALATE 20 MG/1
20 TABLET ORAL DAILY
Qty: 90 TABLET | Refills: 3 | Status: SHIPPED | OUTPATIENT
Start: 2023-03-17

## 2023-03-17 RX ORDER — HYDROXYZINE HYDROCHLORIDE 25 MG/1
25 TABLET, FILM COATED ORAL 3 TIMES DAILY PRN
Qty: 90 TABLET | Refills: 0 | Status: SHIPPED | OUTPATIENT
Start: 2023-03-17

## 2023-03-17 NOTE — PROGRESS NOTES
THIS DOCUMENT WAS MADE IN PART WITH VOICE RECOGNITION SOFTWARE.  OCCASIONALLY THIS SOFTWARE WILL MISINTERPRET WORDS OR PHRASES.    Assessment and Plan:    1. Drug therapy  CBC Auto Differential    Comprehensive Metabolic Panel    Lipid Panel    Hemoglobin A1C    T4, Free    TSH    HIV 1/2 Ag/Ab (4th Gen)    Urinalysis Microscopic    Urinalysis, Reflex to Urine Culture Urine, Clean Catch      2. Encounter for screening for malignant neoplasm of breast, unspecified screening modality  Mammo Digital Screening Bilat      3. Colon cancer screening  Case Request Endoscopy: COLONOSCOPY      4. Encounter for screening mammogram for malignant neoplasm of breast  Mammo Digital Screening Bilat      5. Nicotine dependence, cigarettes, uncomplicated  CT Chest Lung Screening Low Dose      6. Immunization due  (In Office Administered) Pneumococcal Conjugate Vaccine (20 Valent) (IM)      7. Lower urinary tract symptoms  POCT Urinalysis(Instrument)    Urine culture      8. Epigastric pain  pantoprazole (PROTONIX) 40 MG tablet      9. Seasonal allergic rhinitis due to pollen  hydrOXYzine HCL (ATARAX) 25 MG tablet      10. Adjustment disorder with anxiety  EScitalopram oxalate (LEXAPRO) 20 MG tablet          PLAN    Wellness labs as above     Cancer screening with CT scan, mammogram, colonoscopy     Refilled medications, new medication pantoprazole for reflux symptoms     Recheck urine today, low threshold for giving more antibiotics if symptoms secondary to infection     Prevnar 20 vaccine today     Follow-up in 1 month      ______________________________________________________________________  Subjective:    Chief Complaint:  Chief Complaint   Patient presents with    Annual Exam     Check up, med refills         HPI:  Jackie is a 67 y.o. year old      Patient presents for follow-up.  No visits in the past 18 months.  Reports stress level has improved since then because of her cousin moving out.  Chronic conditions reviewed and  appear to be well controlled  Does complain of recurrence of gastritis symptoms, heartburn and reflux.  Denies any trouble swallowing food.  No current medications  Recently treated for urinary tract infection, reports some continuation of foul-smelling urine, lower urinary tract symptoms.  Denies any systemic symptoms.  Denies any fever or nausea or vomiting.      Hypothyroidism, history of Graves disease  Rx-levothyroxine 125 mcg  Though stable, due for lab check  Denies any symptoms of hypo or hyperthyroidism     Dyslipidemia  Rx-rosuvastatin 40 mg  Denies any chest discomfort / SOB   Med compliant     Reflux symptoms   Denies any melena      Anxiety  Rx-Lexapro, clonazepam p.r.n.      Past Medical History:  Past Medical History:   Diagnosis Date    Abnormal colonoscopy 3/21/2019    Colon polyp at [facility] 2018 repeat 5 years.     Graves' disease 3/21/2019    Hyperlipidemia, mixed 3/21/2019    Hypothyroidism (acquired) 3/21/2019    Lyme disease        Past Surgical History:  Past Surgical History:   Procedure Laterality Date    BREAST BIOPSY Left     10+ years ago - Benign       Family History:  Family History   Problem Relation Age of Onset    Breast cancer Mother 60    Breast cancer Maternal Grandmother 75    Glaucoma Neg Hx     Macular degeneration Neg Hx     Retinal detachment Neg Hx        Social History:  Social History     Socioeconomic History    Marital status:    Tobacco Use    Smoking status: Every Day     Packs/day: 0.50     Years: 48.00     Pack years: 24.00     Types: Cigarettes    Smokeless tobacco: Never       Medications:  Current Outpatient Medications on File Prior to Visit   Medication Sig Dispense Refill    EScitalopram oxalate (LEXAPRO) 20 MG tablet Take 1 tablet by mouth once daily 90 tablet 0    hydrOXYzine HCL (ATARAX) 25 MG tablet Take 1 tablet (25 mg total) by mouth 3 (three) times daily as needed for Itching. 90 tablet 0    levothyroxine (EUTHYROX) 125 MCG tablet Take 1 tablet (125  "mcg total) by mouth once daily. 90 tablet 0    mupirocin (BACTROBAN) 2 % ointment Apply topically 3 (three) times daily. 15 g 1    rosuvastatin (CRESTOR) 40 MG Tab TAKE 1 TABLET EVERY EVENING 90 tablet 4    acyclovir (ZOVIRAX) 800 MG Tab Take 1 tablet (800 mg total) by mouth 4 (four) times daily. for 7 days 28 tablet 0    clonazePAM (KLONOPIN) 0.25 MG TbDL Take 1 tablet (0.25 mg total) by mouth 2 (two) times daily as needed. 15 tablet 1    ketoconazole (NIZORAL) 2 % shampoo Apply topically twice a week. (Patient not taking: Reported on 3/17/2023) 120 mL 3    sulfamethoxazole-trimethoprim 800-160mg (BACTRIM DS) 800-160 mg Tab Take 1 tablet by mouth 2 (two) times daily. (Patient not taking: Reported on 3/17/2023) 10 tablet 0     No current facility-administered medications on file prior to visit.       Allergies:  Patient has no known allergies.    Immunizations:  Immunization History   Administered Date(s) Administered    COVID-19, MRNA, LN-S, PF (MODERNA FULL 0.5 ML DOSE) 04/16/2021, 05/20/2021    Pneumococcal Polysaccharide - 23 Valent 10/10/2019       Review of Systems:  Review of Systems   All other systems reviewed and are negative.    Objective:    Vitals:  Vitals:    03/17/23 0945   BP: 128/72   Pulse: 85   SpO2: 98%   Weight: 52.7 kg (116 lb 1.2 oz)   Height: 5' 5" (1.651 m)   PainSc: 0-No pain       Physical Exam  Vitals reviewed.   Constitutional:       General: She is not in acute distress.  HENT:      Head: Normocephalic and atraumatic.   Eyes:      Pupils: Pupils are equal, round, and reactive to light.   Cardiovascular:      Rate and Rhythm: Normal rate and regular rhythm.      Heart sounds: No murmur heard.    No friction rub.   Pulmonary:      Effort: Pulmonary effort is normal.      Breath sounds: Normal breath sounds.   Abdominal:      General: Bowel sounds are normal. There is no distension.      Palpations: Abdomen is soft.      Tenderness: There is no abdominal tenderness.   Musculoskeletal:      " Cervical back: Neck supple.   Skin:     General: Skin is warm and dry.      Findings: No rash.   Psychiatric:         Behavior: Behavior normal.           Drew Grant MD  Family Medicine

## 2023-04-11 ENCOUNTER — PATIENT MESSAGE (OUTPATIENT)
Dept: ADMINISTRATIVE | Facility: HOSPITAL | Age: 68
End: 2023-04-11
Payer: MEDICARE

## 2023-05-03 ENCOUNTER — TELEPHONE (OUTPATIENT)
Dept: GASTROENTEROLOGY | Facility: CLINIC | Age: 68
End: 2023-05-03
Payer: MEDICARE

## 2023-05-17 ENCOUNTER — TELEPHONE (OUTPATIENT)
Dept: GASTROENTEROLOGY | Facility: CLINIC | Age: 68
End: 2023-05-17
Payer: MEDICARE

## 2023-05-17 NOTE — TELEPHONE ENCOUNTER
Called pt in regards to scheduling referral no answer unable to leave vm. Case request cancelled at this time.

## 2023-05-25 DIAGNOSIS — E03.9 HYPOTHYROIDISM (ACQUIRED): ICD-10-CM

## 2023-05-25 DIAGNOSIS — E05.00 GRAVES' DISEASE: ICD-10-CM

## 2023-05-25 RX ORDER — LEVOTHYROXINE SODIUM 125 UG/1
TABLET ORAL
Qty: 90 TABLET | Refills: 3 | Status: SHIPPED | OUTPATIENT
Start: 2023-05-25

## 2023-05-25 NOTE — TELEPHONE ENCOUNTER
----- Message from Joy Govea sent at 5/25/2023  2:22 PM CDT -----  Regarding: refill  Can the clinic reply in [facility]:       Please refill the medication(s) listed below. Please call the patient when the prescription(s) is ready for  at this phone number   pt has only three pills left . MARICEL HOGAN [[id number]] [phone] (home)         Medication #1 levothyroxine (SYNTHROID) 125 MCG tablet        Preferred Pharmacy:   [address] [place] - [address]  [address]  [place] [address]  Phone: [phone] Fax: [phone]

## 2023-05-25 NOTE — TELEPHONE ENCOUNTER
No care due was identified.  [facility] Embedded Care Due Messages. Reference number: [id number].   5/25/2023 2:44:28 PM CDT

## 2023-06-30 ENCOUNTER — APPOINTMENT (RX ONLY)
Dept: URBAN - METROPOLITAN AREA CLINIC 84 | Facility: CLINIC | Age: 68
Setting detail: DERMATOLOGY
End: 2023-06-30

## 2023-06-30 PROBLEM — D04.9 CARCINOMA IN SITU OF SKIN, UNSPECIFIED: Status: ACTIVE | Noted: 2023-06-30

## 2023-06-30 PROCEDURE — ? COUNSELING

## 2023-06-30 PROCEDURE — ? ADDITIONAL NOTES

## 2023-06-30 PROCEDURE — 99213 OFFICE O/P EST LOW 20 MIN: CPT

## 2023-06-30 PROCEDURE — ? PRESCRIPTION

## 2023-06-30 RX ORDER — FLUOROURACIL 5 MG/G
CREAM TOPICAL BID
Qty: 40 | Refills: 0 | Status: ERX | COMMUNITY
Start: 2023-06-30

## 2023-06-30 RX ADMIN — FLUOROURACIL: 5 CREAM TOPICAL at 00:00

## 2023-06-30 NOTE — PROCEDURE: ADDITIONAL NOTES
Render Risk Assessment In Note?: no
Additional Notes: bx in 2021; untreated\\n\\ngiven large size of 5.5 x 3.5 cm- start with efudex cream bid x 4 weeks; rtc in 6 weeks for possible extension of efudex or ED&C to residual
Detail Level: Simple

## 2023-08-10 ENCOUNTER — APPOINTMENT (RX ONLY)
Dept: URBAN - METROPOLITAN AREA CLINIC 84 | Facility: CLINIC | Age: 68
Setting detail: DERMATOLOGY
End: 2023-08-10

## 2023-08-10 PROBLEM — D04.9 CARCINOMA IN SITU OF SKIN, UNSPECIFIED: Status: ACTIVE | Noted: 2023-08-10

## 2023-08-10 PROCEDURE — ? PRESCRIPTION

## 2023-08-10 PROCEDURE — 99214 OFFICE O/P EST MOD 30 MIN: CPT

## 2023-08-10 PROCEDURE — ? COUNSELING

## 2023-08-10 PROCEDURE — ? ADDITIONAL NOTES

## 2023-08-10 RX ORDER — IMIQUIMOD 12.5 MG/.25G
CREAM TOPICAL
Qty: 12 | Refills: 0 | Status: ERX | COMMUNITY
Start: 2023-08-10

## 2023-08-10 RX ADMIN — IMIQUIMOD: 12.5 CREAM TOPICAL at 00:00

## 2023-08-10 NOTE — PROCEDURE: ADDITIONAL NOTES
Render Risk Assessment In Note?: no
Additional Notes: bx in 2021; 5FU prescribed at prior visit. Patient endorses robust reaction, irritation, and “new onset small period” when using this medicine. Stopped after 5 days of use.\\n\\nImproved today but remains large - 5.5 x 3.5 cm\\n\\nRecommended transition to imiquimod once daily M-F for up to 6 weeks.\\n\\nrtc in 6 weeks for possible extension of imiquimod or ED&C to residual
Detail Level: Simple

## 2024-06-04 ENCOUNTER — HOSPITAL ENCOUNTER (EMERGENCY)
Facility: HOSPITAL | Age: 69
Discharge: LEFT BEFORE DISCHARGE | End: 2024-06-04
Payer: MEDICARE

## 2024-06-04 PROCEDURE — 4500999001 HC ED NO CHARGE

## 2024-06-28 DIAGNOSIS — E05.00 GRAVES' DISEASE: ICD-10-CM

## 2024-06-28 DIAGNOSIS — E03.9 HYPOTHYROIDISM (ACQUIRED): ICD-10-CM

## 2024-06-28 NOTE — TELEPHONE ENCOUNTER
Refill Routing Note   Medication(s) are not appropriate for processing by [facility] for the following reason(s):        Patient not seen by provider within 15 months    [facility] action(s):  Route      Medication Therapy Plan: FOVS 7/3/24      Appointments  past 12m or future 3m with PCP    Date Provider   Last Visit   3/17/2023 Drew Grant MD   Next Visit   Visit date not found Drew Grant MD   ED visits in past 90 days: 0        Note composed:5:28 PM 06/28/2024

## 2024-06-28 NOTE — TELEPHONE ENCOUNTER
Please approve for levothyroxine (SYNTHROID) 125 MCG tablet    Last OV 03/17/23  Last refill date 05/25/23  Last labs 03/17/23    Next appt (wait list) 07/03/24

## 2024-06-28 NOTE — TELEPHONE ENCOUNTER
No care due was identified.  Health [facility] Embedded Care Due Messages. Reference number: [id number].   6/28/2024 3:21:12 PM CDT

## 2024-06-28 NOTE — TELEPHONE ENCOUNTER
----- Message from Umu Henry sent at 6/28/2024  2:37 PM CDT -----  Type:  Sooner Appointment Request    Caller is requesting a sooner appointment.  Caller declined first available appointment listed below.  Caller will not accept being placed on the waitlist and is requesting a message be sent to doctor.    Name of Caller:  pt  When is the first available appointment?  N/A  Symptoms:  annual, refills  Would the patient rather a call back or a response via [facility]? Call back  Best Call Back Number:  [phone]  Additional Information:  pt states that first available does not work for them and needs to be seen for her annual visit. Pt also needs her refills. Pt says that she is completely out of her Rx and needs to get them filled at least to last until her appt. Please call back and advise. Thanks!

## 2024-06-30 RX ORDER — LEVOTHYROXINE SODIUM 125 UG/1
TABLET ORAL
Qty: 90 TABLET | Refills: 0 | Status: SHIPPED | OUTPATIENT
Start: 2024-06-30

## 2024-07-03 ENCOUNTER — OFFICE VISIT (OUTPATIENT)
Dept: FAMILY MEDICINE | Facility: CLINIC | Age: 69
End: 2024-07-03
Payer: MEDICARE

## 2024-07-03 VITALS
DIASTOLIC BLOOD PRESSURE: 72 MMHG | SYSTOLIC BLOOD PRESSURE: 132 MMHG | BODY MASS INDEX: 19.04 KG/M2 | HEART RATE: 69 BPM | HEIGHT: 65 IN | WEIGHT: 114.31 LBS | OXYGEN SATURATION: 96 %

## 2024-07-03 DIAGNOSIS — F43.22 ADJUSTMENT DISORDER WITH ANXIETY: ICD-10-CM

## 2024-07-03 DIAGNOSIS — E03.9 HYPOTHYROIDISM (ACQUIRED): ICD-10-CM

## 2024-07-03 DIAGNOSIS — Z00.00 ANNUAL PHYSICAL EXAM: Primary | ICD-10-CM

## 2024-07-03 DIAGNOSIS — F17.210 NICOTINE DEPENDENCE, CIGARETTES, UNCOMPLICATED: ICD-10-CM

## 2024-07-03 DIAGNOSIS — E44.1 MILD PROTEIN-CALORIE MALNUTRITION: ICD-10-CM

## 2024-07-03 DIAGNOSIS — L21.9 SEBORRHEIC DERMATITIS: ICD-10-CM

## 2024-07-03 DIAGNOSIS — E78.01 FAMILIAL HYPERCHOLESTEROLEMIA: ICD-10-CM

## 2024-07-03 DIAGNOSIS — Z12.31 ENCOUNTER FOR SCREENING MAMMOGRAM FOR MALIGNANT NEOPLASM OF BREAST: ICD-10-CM

## 2024-07-03 DIAGNOSIS — Z79.899 DRUG THERAPY: ICD-10-CM

## 2024-07-03 DIAGNOSIS — F33.42 RECURRENT MAJOR DEPRESSIVE DISORDER, IN FULL REMISSION: ICD-10-CM

## 2024-07-03 PROCEDURE — 3078F DIAST BP <80 MM HG: CPT | Mod: CPTII,S$GLB,, | Performed by: NURSE PRACTITIONER

## 2024-07-03 PROCEDURE — 99999 PR PBB SHADOW E&M-EST. PATIENT-LVL III: CPT | Mod: PBBFAC,,, | Performed by: NURSE PRACTITIONER

## 2024-07-03 PROCEDURE — 99214 OFFICE O/P EST MOD 30 MIN: CPT | Mod: S$GLB,,, | Performed by: NURSE PRACTITIONER

## 2024-07-03 PROCEDURE — 1159F MED LIST DOCD IN RCRD: CPT | Mod: CPTII,S$GLB,, | Performed by: NURSE PRACTITIONER

## 2024-07-03 PROCEDURE — 3075F SYST BP GE 130 - 139MM HG: CPT | Mod: CPTII,S$GLB,, | Performed by: NURSE PRACTITIONER

## 2024-07-03 PROCEDURE — 1126F AMNT PAIN NOTED NONE PRSNT: CPT | Mod: CPTII,S$GLB,, | Performed by: NURSE PRACTITIONER

## 2024-07-03 PROCEDURE — 3008F BODY MASS INDEX DOCD: CPT | Mod: CPTII,S$GLB,, | Performed by: NURSE PRACTITIONER

## 2024-07-03 RX ORDER — CLONAZEPAM 0.25 MG/1
0.25 TABLET, ORALLY DISINTEGRATING ORAL 2 TIMES DAILY PRN
Qty: 15 TABLET | Refills: 1 | Status: SHIPPED | OUTPATIENT
Start: 2024-07-03 | End: 2025-07-03

## 2024-07-03 RX ORDER — KETOCONAZOLE 20 MG/ML
SHAMPOO, SUSPENSION TOPICAL
Qty: 120 ML | Refills: 2 | Status: SHIPPED | OUTPATIENT
Start: 2024-07-03

## 2024-07-03 RX ORDER — MUPIROCIN 20 MG/G
OINTMENT TOPICAL 3 TIMES DAILY
Qty: 30 G | Refills: 0 | Status: SHIPPED | OUTPATIENT
Start: 2024-07-03 | End: 2024-07-10

## 2024-07-03 RX ORDER — ROSUVASTATIN CALCIUM 40 MG/1
40 TABLET, COATED ORAL NIGHTLY
Qty: 90 TABLET | Refills: 4 | Status: SHIPPED | OUTPATIENT
Start: 2024-07-03

## 2024-07-03 RX ORDER — ESCITALOPRAM OXALATE 20 MG/1
20 TABLET ORAL DAILY
Qty: 90 TABLET | Refills: 3 | Status: SHIPPED | OUTPATIENT
Start: 2024-07-03

## 2024-07-03 NOTE — PROGRESS NOTES
THIS DOCUMENT WAS MADE IN PART WITH VOICE RECOGNITION SOFTWARE.  OCCASIONALLY THIS SOFTWARE WILL MISINTERPRET WORDS OR PHRASES.     Assessment and Plan:    Annual physical exam  Comments:  anticipatory guidance reviewed  Orders:  -     CBC Without Differential; Future; Expected date: 07/03/2024  -     Comprehensive Metabolic Panel; Future; Expected date: 07/03/2024  -     Lipid Panel; Future; Expected date: 07/03/2024  -     Hemoglobin A1C; Future; Expected date: 07/03/2024  -     Urinalysis, Reflex to Urine Culture Urine, Clean Catch; Future; Expected date: 07/03/2024    Drug therapy  -     CBC Without Differential; Future; Expected date: 07/03/2024  -     Comprehensive Metabolic Panel; Future; Expected date: 07/03/2024  -     Lipid Panel; Future; Expected date: 07/03/2024  -     TSH; Future; Expected date: 07/03/2024  -     Hemoglobin A1C; Future; Expected date: 07/03/2024  -     T4, Free; Future; Expected date: 07/03/2024    Hypothyroidism (acquired)  Comments:  controlled   continue levothyroxine   periodic monitoring TSH  Orders:  -     CBC Without Differential; Future; Expected date: 07/03/2024  -     Comprehensive Metabolic Panel; Future; Expected date: 07/03/2024  -     TSH; Future; Expected date: 07/03/2024  -     T4, Free; Future; Expected date: 07/03/2024    Recurrent major depressive disorder, in full remission  Comments:  controlled   continue Lexapro  Orders:  -     EScitalopram oxalate (LEXAPRO) 20 MG tablet; Take 1 tablet (20 mg total) by mouth once daily.  Dispense: 90 tablet; Refill: 3    Mild protein-calorie malnutrition  Comments:  advised on diet  Orders:  -     CBC Without Differential; Future; Expected date: 07/03/2024  -     Comprehensive Metabolic Panel; Future; Expected date: 07/03/2024    Familial hypercholesterolemia  Comments:  controlled   continue rosuvastatin   periodic monitoring lipid  Orders:  -     Lipid Panel; Future; Expected date: 07/03/2024  -     rosuvastatin (CRESTOR) 40 MG  Tab; Take 1 tablet (40 mg total) by mouth every evening.  Dispense: 90 tablet; Refill: 4    Adjustment disorder with anxiety  Comments:  controlled   continue Lexapro   clonazepam as needed- takes sparingly  Orders:  -     EScitalopram oxalate (LEXAPRO) 20 MG tablet; Take 1 tablet (20 mg total) by mouth once daily.  Dispense: 90 tablet; Refill: 3  -     clonazePAM (KLONOPIN) 0.25 MG TbDL; Take 1 tablet (0.25 mg total) by mouth 2 (two) times daily as needed (anxiety).  Dispense: 15 tablet; Refill: 1    Seborrheic dermatitis  Comments:  advised on symptomatic treatment and medication  Orders:  -     ketoconazole (NIZORAL) 2 % shampoo; Use 3x/week; leave on scalp for 5mins before rinsing.  Dispense: 120 mL; Refill: 2  -     mupirocin (BACTROBAN) 2 % ointment; Apply topically 3 (three) times daily. for 7 days  Dispense: 30 g; Refill: 0    Nicotine dependence, cigarettes, uncomplicated  Comments:  recommend patient quit smoking   patient not ready at this time    Encounter for screening mammogram for malignant neoplasm of breast  -     Mammo Digital Screening Bilat w/ Trent; Future; Expected date: 07/03/2024         Visit summary:     Annual labs to be completed.     Chronic conditions stable    Screening recommendations appropriate to age and health status were reviewed.     Continue to work on regular exercise, maintain healthy weight, balanced diet. Avoid unhealthy habits: smoking, excessive alcohol intake.     Follow up in about 6 months (around 1/3/2025) for Follow-up with PCP.   ______________________________________________________________________    Subjective:    Chief Complaint:  Annual exam    HPI:  Jackie is a 68 y.o. year old patient here for annual exam.       Patient reports she  is having some itching  and flaking to scalp -  requesting  refills  on mupirocin and ketoconazole shampoo    Hypothyroidism, history of Graves disease  Rx-levothyroxine 125 mcg  Though stable, due for lab check  Denies any  symptoms of hypo or hyperthyroidism- states that she was out of her medication for a few days last week.      Dyslipidemia  Rx-rosuvastatin 40 mg  Denies any chest discomfort / SOB   Med compliant     Reflux symptoms  improved with Protonix   Denies any melena      Anxiety  Rx-Lexapro, clonazepam p.r.n. controlled-  requesting refill            Past Medical History:  Past Medical History:   Diagnosis Date    Abnormal colonoscopy 3/21/2019    Colon polyp at [facility] 2018 repeat 5 years.     Graves' disease 3/21/2019    Hyperlipidemia, mixed 3/21/2019    Hypothyroidism (acquired) 3/21/2019    Lyme disease        Past Surgical History:  Past Surgical History:   Procedure Laterality Date    BREAST BIOPSY Left     10+ years ago - Benign       Family History:  Family History   Problem Relation Name Age of Onset    Breast cancer Mother  60    Breast cancer Maternal Grandmother  75    Glaucoma Neg Hx      Macular degeneration Neg Hx      Retinal detachment Neg Hx         Social History:  Social History     Socioeconomic History    Marital status:    Tobacco Use    Smoking status: Every Day     Current packs/day: 0.50     Average packs/day: 0.5 packs/day for 48.5 years (24.3 ttl pk-yrs)     Types: Cigarettes     Start date: 1976     Passive exposure: Current    Smokeless tobacco: Never       Medications:  Current Outpatient Medications on File Prior to Visit   Medication Sig Dispense Refill    hydrOXYzine HCL (ATARAX) 25 MG tablet Take 1 tablet (25 mg total) by mouth 3 (three) times daily as needed for Itching. 90 tablet 0    levothyroxine (SYNTHROID) 125 MCG tablet TAKE 1 TABLET BY MOUTH ONCE  DAILY (EQUIVALENT TO EUTHYROX) 90 tablet 0    pantoprazole (PROTONIX) 40 MG tablet Take 1 tablet by mouth once daily 90 tablet 1    [DISCONTINUED] clonazePAM (KLONOPIN) 0.25 MG TbDL Take 1 tablet (0.25 mg total) by mouth 2 (two) times daily as needed. 15 tablet 1    [DISCONTINUED] EScitalopram oxalate (LEXAPRO) 20 MG tablet Take 1  "tablet (20 mg total) by mouth once daily. 90 tablet 3    [DISCONTINUED] rosuvastatin (CRESTOR) 40 MG Tab TAKE 1 TABLET EVERY EVENING 90 tablet 4    [DISCONTINUED] acyclovir (ZOVIRAX) 800 MG Tab Take 1 tablet (800 mg total) by mouth 4 (four) times daily. for 7 days (Patient not taking: Reported on 7/3/2024) 28 tablet 0     No current facility-administered medications on file prior to visit.       Allergies:  Patient has no known allergies.    Immunizations:  Immunization History   Administered Date(s) Administered    COVID-19, MRNA, LN-S, PF (MODERNA FULL 0.5 ML DOSE) 04/16/2021, 05/20/2021    Pneumococcal Conjugate - 20 Valent 03/17/2023    Pneumococcal Polysaccharide - 23 Valent 10/10/2019       Review of Systems:  Review of Systems   Constitutional:  Negative for fatigue and unexpected weight change.   HENT:  Negative for congestion, postnasal drip and rhinorrhea.    Respiratory:  Negative for cough and shortness of breath.    Gastrointestinal:  Negative for constipation, diarrhea, nausea and vomiting.   Genitourinary:  Negative for difficulty urinating and dysuria.   Musculoskeletal:  Negative for arthralgias and back pain.   Skin:  Negative for rash.         Itching and flaking to scalp   Neurological:  Negative for dizziness and headaches.       Objective:    Vitals:  Vitals:    07/03/24 1313   BP: 132/72   Pulse: 69   SpO2: 96%   Weight: 51.9 kg (114 lb 4.9 oz)   Height: 5' 5" (1.651 m)   PainSc: 0-No pain       Physical Exam  Vitals and nursing note reviewed.   Constitutional:       General: She is not in acute distress.  HENT:      Head: Normocephalic and atraumatic.      Nose: Nose normal.      Mouth/Throat:      Mouth: Mucous membranes are moist.      Pharynx: Oropharynx is clear.   Eyes:      Pupils: Pupils are equal, round, and reactive to light.   Cardiovascular:      Rate and Rhythm: Normal rate and regular rhythm.      Heart sounds: No murmur heard.     No friction rub.   Pulmonary:      Effort: " Pulmonary effort is normal.      Breath sounds: Normal breath sounds.   Abdominal:      General: Bowel sounds are normal. There is no distension.      Palpations: Abdomen is soft.      Tenderness: There is no abdominal tenderness.   Musculoskeletal:      Cervical back: Neck supple.   Skin:     General: Skin is warm and dry.      Findings: No rash.   Neurological:      Mental Status: She is oriented to person, place, and time.   Psychiatric:         Mood and Affect: Mood normal.         Behavior: Behavior normal.         Thought Content: Thought content normal.         Data:  The 10-year ASCVD risk score (Mary VALVERDE, et al., 2019) is: 10.1%    Values used to calculate the score:      Age: 68 years      Sex: Female      Is Non- : No      Diabetic: No      Tobacco smoker: Yes      Systolic Blood Pressure: 132 mmHg      Is BP treated: No      HDL Cholesterol: 92 mg/dL      Total Cholesterol: 222 mg/dL        Lab Results   Component Value Date    HGBA1C 5.6 03/17/2023      Medical history reviewed, Medications reconciled.          SVETLANA White-C  Family Medicine

## 2024-09-24 DIAGNOSIS — E05.00 GRAVES' DISEASE: ICD-10-CM

## 2024-09-24 DIAGNOSIS — E03.9 HYPOTHYROIDISM (ACQUIRED): ICD-10-CM

## 2024-09-24 NOTE — TELEPHONE ENCOUNTER
Refill Routing Note   Medication(s) are not appropriate for processing by [facility] for the following reason(s):        Patient not seen by provider within 15 months  Required labs outdated    [facility] action(s):  Defer   Requires appointment : Yes             Appointments  past 12m or future 3m with PCP    Date Provider   Last Visit   Visit date not found Drew Grant MD   Next Visit   Visit date not found Drew Grant MD   ED visits in past 90 days: 0        Note composed:6:23 PM 09/24/2024

## 2024-09-24 NOTE — TELEPHONE ENCOUNTER
Care Due:                  Date            Visit Type   Department     Provider  --------------------------------------------------------------------------------                                EP -                              PRIMARY      [facility] FAMILY  Last Visit: 03-      CARE ([facility])   MEDICINE       Drew Grant  Next Visit: None Scheduled  None         None Found                                                            Last  Test          Frequency    Reason                     Performed    Due Date  --------------------------------------------------------------------------------    Office Visit  15 months..  pantoprazole.............  03- 06-    Health [facility] Embedded Care Due Messages. Reference number: [id number].   9/24/2024 11:54:44 AM CDT

## 2024-09-25 RX ORDER — LEVOTHYROXINE SODIUM 125 UG/1
TABLET ORAL
Qty: 90 TABLET | Refills: 3 | Status: SHIPPED | OUTPATIENT
Start: 2024-09-25

## 2024-09-26 DIAGNOSIS — R10.13 EPIGASTRIC PAIN: ICD-10-CM

## 2024-09-26 RX ORDER — PANTOPRAZOLE SODIUM 40 MG/1
40 TABLET, DELAYED RELEASE ORAL
Qty: 90 TABLET | Refills: 3 | Status: SHIPPED | OUTPATIENT
Start: 2024-09-26

## 2024-09-26 NOTE — TELEPHONE ENCOUNTER
No care due was identified.  Health [facility] Embedded Care Due Messages. Reference number: [id number].   9/26/2024 7:36:16 AM CDT

## 2024-09-26 NOTE — TELEPHONE ENCOUNTER
Refill Routing Note   Medication(s) are not appropriate for processing by [facility] for the following reason(s):        Patient not seen by provider within 15 months    [facility] action(s):  Defer               Appointments  past 12m or future 3m with PCP    Date Provider   Last Visit   3/17/2023 Drew Grant MD   Next Visit   Visit date not found Drew Grant MD   ED visits in past 90 days: 0        Note composed:3:51 PM 09/26/2024           Refill requested for:    Disp Refills Start End    albuterol 108 (90 Base) MCG/ACT inhaler 18 g 1 9/5/2023 --    Sig - Route: Inhale 2 puffs into the lungs every 4 hours as needed for Shortness of Breath or Wheezing. Inhale 2 puffs by mouth every 4 to 6 hours as needed for wheeze, or cough - Inhalation    Sent to pharmacy as: Albuterol Sulfate  (90 Base) MCG/ACT Inhalation Aerosol Solution    Class: Eprescribe      Protocol Passed: Yes      Last visit: 9/5/2023 with: Braydon Reagan MD  Upcoming visit: Visit date not found with: Braydon Reagan MD     Request forwarded to Braydon Reagan MD  Medication refilled per protocol.'

## 2024-09-26 NOTE — TELEPHONE ENCOUNTER
Please approve for Pantoprazole Sodium 40 MG Oral Tablet Delayed Release     Last OV 07/03/24  Next appt --

## 2025-01-08 ENCOUNTER — OFFICE VISIT (OUTPATIENT)
Dept: FAMILY MEDICINE | Facility: CLINIC | Age: 70
End: 2025-01-08
Payer: MEDICARE

## 2025-01-08 VITALS
SYSTOLIC BLOOD PRESSURE: 130 MMHG | HEIGHT: 65 IN | DIASTOLIC BLOOD PRESSURE: 70 MMHG | BODY MASS INDEX: 19.02 KG/M2 | RESPIRATION RATE: 18 BRPM

## 2025-01-08 DIAGNOSIS — J01.90 ACUTE BACTERIAL SINUSITIS: Primary | ICD-10-CM

## 2025-01-08 DIAGNOSIS — B96.89 ACUTE BACTERIAL SINUSITIS: Primary | ICD-10-CM

## 2025-01-08 DIAGNOSIS — F43.22 ADJUSTMENT DISORDER WITH ANXIETY: ICD-10-CM

## 2025-01-08 RX ORDER — CLONAZEPAM 0.25 MG/1
0.25 TABLET, ORALLY DISINTEGRATING ORAL 2 TIMES DAILY PRN
Qty: 15 TABLET | Refills: 1 | Status: SHIPPED | OUTPATIENT
Start: 2025-01-08 | End: 2026-01-08

## 2025-01-08 RX ORDER — DOXYCYCLINE 100 MG/1
100 CAPSULE ORAL 2 TIMES DAILY
Qty: 14 CAPSULE | Refills: 0 | Status: SHIPPED | OUTPATIENT
Start: 2025-01-08 | End: 2025-01-15

## 2025-01-08 RX ORDER — CLONAZEPAM 0.25 MG/1
TABLET, ORALLY DISINTEGRATING ORAL
Qty: 15 TABLET | Refills: 0 | OUTPATIENT
Start: 2025-01-08

## 2025-01-08 NOTE — PROGRESS NOTES
THIS DOCUMENT WAS MADE IN PART WITH VOICE RECOGNITION SOFTWARE.  OCCASIONALLY THIS SOFTWARE WILL MISINTERPRET WORDS OR PHRASES.     Assessment and Plan:    Acute bacterial sinusitis  Comments:  Advised on symptomatic treatment  Orders:  -     doxycycline (VIBRAMYCIN) 100 MG Cap; Take 1 capsule (100 mg total) by mouth 2 (two) times daily. for 7 days  Dispense: 14 capsule; Refill: 0    Adjustment disorder with anxiety  Comments:  controlled   continue Lexapro   clonazepam as needed- takes sparingly  PDMP reviewed  Orders:  -     clonazePAM (KLONOPIN) 0.25 MG TbDL; Take 1 tablet (0.25 mg total) by mouth 2 (two) times daily as needed (anxiety).  Dispense: 15 tablet; Refill: 1           Visit Summary:  Patient was seen via virtual visit for sinusitis  Patient aware of limitations to assessment and exam of telemedicine.   Presenting signs and symptoms suggestive of bacterial sinusitis. Covered with doxycycline. We discussed symptomatic management with OTC meds and saline rinses. Will add Mucinex.  Will take Tylenol or Ibuprofen as needed for any pain and/or fever. Advised on signs/symptoms of emergent conditions. Patient advised to let us know if symptoms persist or if She develops any new or worsening symptoms.           Follow up in about 1 year (around 1/8/2026), or if symptoms worsen or fail to improve.   ______________________________________________________________________  Subjective:    Chief Complaint:  Sinusitis  HPI:  Jackie is a 69 y.o. year old female being seen today in virtual visit consultation to discuss sinusitis    The patient location is:  Patient Home   The chief complaint leading to consultation is: sinus congestion  Visit type: Virtual visit with synchronous audio and video  Face to Face time with patient: 15 minutes  30 minutes of total time spent on the encounter, which includes face to face time and non-face to face time preparing to see the patient (eg, review of tests), Obtaining and/or  reviewing separately obtained history, Documenting clinical information in the electronic or other health record, Independently interpreting results (not separately reported) and communicating results to the patient/family/caregiver, or Care coordination (not separately reported).      She reports having a cold a couple of weeks ago- She reports that she hasn't been able to shake the sinus congestion - worsening over the past 3 days with associated greenish nasal drainage.  She reports slight cough- due to postnasal drip. Using saline sinus rinses.  She denies shortness of breath, fever and chest pain.     She is also requesting refill on her clonazepam - takes sparingly as needed helps with anxiety brought on by smoking sensation.  PDMP reviewed-15 tablets last filled 9/19/2024          Medications:  Current Outpatient Medications on File Prior to Visit   Medication Sig Dispense Refill    EScitalopram oxalate (LEXAPRO) 20 MG tablet Take 1 tablet (20 mg total) by mouth once daily. 90 tablet 3    hydrOXYzine HCL (ATARAX) 25 MG tablet Take 1 tablet (25 mg total) by mouth 3 (three) times daily as needed for Itching. 90 tablet 0    ketoconazole (NIZORAL) 2 % shampoo Use 3x/week; leave on scalp for 5mins before rinsing. 120 mL 2    levothyroxine (SYNTHROID) 125 MCG tablet Take 1 tablet by mouth once daily 90 tablet 3    pantoprazole (PROTONIX) 40 MG tablet Take 1 tablet by mouth once daily 90 tablet 3    rosuvastatin (CRESTOR) 40 MG Tab Take 1 tablet (40 mg total) by mouth every evening. 90 tablet 4    [DISCONTINUED] clonazePAM (KLONOPIN) 0.25 MG TbDL Take 1 tablet (0.25 mg total) by mouth 2 (two) times daily as needed (anxiety). 15 tablet 1     No current facility-administered medications on file prior to visit.       Review of Systems:  Review of Systems   Constitutional:  Negative for chills and fever.   HENT:  Positive for congestion, postnasal drip and rhinorrhea. Negative for ear pain and sore throat.   "  Respiratory:  Positive for cough. Negative for shortness of breath and wheezing.    Cardiovascular:  Negative for chest pain.   Musculoskeletal:  Negative for myalgias.   Skin:  Negative for rash.   Allergic/Immunologic: Negative for environmental allergies.   Neurological:  Positive for headaches.       Past Medical History:  Past Medical History:   Diagnosis Date    Abnormal colonoscopy 3/21/2019    Colon polyp at  2018 repeat 5 years.     Graves' disease 3/21/2019    Hyperlipidemia, mixed 3/21/2019    Hypothyroidism (acquired) 3/21/2019    Lyme disease        Objective:    Vitals:  Vitals:    01/08/25 1150   BP: 130/70   Resp: 18   Height: 5' 5" (1.651 m)       Physical Exam  Constitutional:       General: She is not in acute distress.  HENT:      Head: Normocephalic.      Nose: Congestion (patient sounds congested on virtual visit) present.   Eyes:      General: No scleral icterus.  Pulmonary:      Effort: Pulmonary effort is normal. No respiratory distress.   Neurological:      Mental Status: She is oriented to person, place, and time.   Psychiatric:         Mood and Affect: Mood normal.         Behavior: Behavior normal.         Thought Content: Thought content normal.         Data:        Medical history reviewed, Medications reconciled.            YVONNE White  Family Medicine    "

## 2025-07-08 ENCOUNTER — TELEPHONE (OUTPATIENT)
Dept: FAMILY MEDICINE | Facility: CLINIC | Age: 70
End: 2025-07-08
Payer: MEDICARE

## 2025-07-08 NOTE — TELEPHONE ENCOUNTER
Copied from CRM #[id number]. Topic: Appointments - Amb Referral  >> Jul 8, 2025  1:26 PM Ira wrote:  Type:  Needs Medical Advice    Who Called: Patient at [phone]    Additional Information: Patient would like to schedule mammogram and colonoscopy before the end of July, please call patient and advise  Thank you.

## 2025-07-28 ENCOUNTER — TELEPHONE (OUTPATIENT)
Dept: FAMILY MEDICINE | Facility: CLINIC | Age: 70
End: 2025-07-28
Payer: MEDICARE

## 2025-07-28 DIAGNOSIS — F43.22 ADJUSTMENT DISORDER WITH ANXIETY: ICD-10-CM

## 2025-07-28 RX ORDER — CLONAZEPAM 0.25 MG/1
TABLET, ORALLY DISINTEGRATING ORAL
Qty: 15 TABLET | Refills: 0 | OUTPATIENT
Start: 2025-07-28

## 2025-07-28 NOTE — TELEPHONE ENCOUNTER
Pt needs to be seen prior to me refilling medication.   Prescribing physician must see the patient in person yearly for controlled substance

## 2025-07-28 NOTE — TELEPHONE ENCOUNTER
Copied from CRM #[id number]. Topic: Medications - Medication Refill  >> 2025 11:02 AM Vandana wrote:  Type:  Needs Medical Advice    Who Called: self  Symptoms (please be specific): pt needs a refill on rx, clonazePAM (KLONOPIN) 0.25 MG TbDL, pt sts she had a refills but it , pt has starting having hand tremors     Pharmacy name and phone #:    [id number] - [place] - [address]  [address]  [place] [address]  Phone: [phone] Fax: [phone]    Would the patient rather a call back or a response via [facility]? call  Best Call Back Number: [phone] (home)    Additional Information: please advise and thank you.